# Patient Record
Sex: FEMALE | Race: OTHER | HISPANIC OR LATINO | Employment: UNEMPLOYED | URBAN - METROPOLITAN AREA
[De-identification: names, ages, dates, MRNs, and addresses within clinical notes are randomized per-mention and may not be internally consistent; named-entity substitution may affect disease eponyms.]

---

## 2019-10-08 ENCOUNTER — HOSPITAL ENCOUNTER (OUTPATIENT)
Dept: ONCOLOGY | Facility: HOSPITAL | Age: 54
Discharge: HOME | End: 2019-10-08
Attending: INTERNAL MEDICINE

## 2019-10-08 ENCOUNTER — OFFICE VISIT CONVERTED (OUTPATIENT)
Dept: ONCOLOGY | Facility: HOSPITAL | Age: 54
End: 2019-10-08
Attending: INTERNAL MEDICINE

## 2019-12-20 ENCOUNTER — HOSPITAL ENCOUNTER (OUTPATIENT)
Dept: GENERAL RADIOLOGY | Facility: HOSPITAL | Age: 54
Discharge: HOME | End: 2019-12-20
Attending: NURSE PRACTITIONER

## 2020-01-07 ENCOUNTER — OFFICE VISIT CONVERTED (OUTPATIENT)
Dept: ONCOLOGY | Facility: HOSPITAL | Age: 55
End: 2020-01-07
Attending: INTERNAL MEDICINE

## 2020-01-07 ENCOUNTER — HOSPITAL ENCOUNTER (OUTPATIENT)
Dept: ONCOLOGY | Facility: HOSPITAL | Age: 55
Discharge: HOME | End: 2020-01-07
Attending: INTERNAL MEDICINE

## 2020-02-14 ENCOUNTER — HOSPITAL ENCOUNTER (OUTPATIENT)
Dept: OTHER | Facility: HOSPITAL | Age: 55
Setting detail: RECURRING SERIES
Discharge: STILL ADMITTED OR ADMINISTRATIVE | End: 2020-05-07
Attending: INTERNAL MEDICINE

## 2020-02-14 LAB
CALCIUM SERPL-MCNC: 9.2 MG/DL (ref 8.7–10.4)
CREAT UR-MCNC: 0.73 MG/DL (ref 0.5–0.9)

## 2020-03-02 ENCOUNTER — OFFICE VISIT CONVERTED (OUTPATIENT)
Dept: SURGERY | Facility: CLINIC | Age: 55
End: 2020-03-02
Attending: NURSE PRACTITIONER

## 2020-03-02 ENCOUNTER — CONVERSION ENCOUNTER (OUTPATIENT)
Dept: SURGERY | Facility: CLINIC | Age: 55
End: 2020-03-02

## 2020-05-05 ENCOUNTER — OFFICE VISIT CONVERTED (OUTPATIENT)
Dept: ONCOLOGY | Facility: HOSPITAL | Age: 55
End: 2020-05-05
Attending: INTERNAL MEDICINE

## 2020-05-05 ENCOUNTER — HOSPITAL ENCOUNTER (OUTPATIENT)
Dept: ONCOLOGY | Facility: HOSPITAL | Age: 55
Discharge: HOME | End: 2020-05-05
Attending: INTERNAL MEDICINE

## 2020-05-05 LAB
ALBUMIN SERPL-MCNC: 4.1 G/DL (ref 3.5–5)
ALBUMIN/GLOB SERPL: 1.2 {RATIO} (ref 1.4–2.6)
ALP SERPL-CCNC: 72 U/L (ref 53–141)
ALT SERPL-CCNC: 25 U/L (ref 10–40)
ANION GAP SERPL CALC-SCNC: 10 MMOL/L (ref 8–19)
AST SERPL-CCNC: 19 U/L (ref 15–50)
BASOPHILS # BLD AUTO: 0.03 10*3/UL (ref 0–0.2)
BASOPHILS NFR BLD AUTO: 0.6 % (ref 0–3)
BILIRUB SERPL-MCNC: 0.19 MG/DL (ref 0.2–1.3)
BUN SERPL-MCNC: 11 MG/DL (ref 5–25)
BUN/CREAT SERPL: 18 {RATIO} (ref 6–20)
CALCIUM SERPL-MCNC: 9.4 MG/DL (ref 8.7–10.4)
CALCIUM SPEC-SCNC: 9.4 MG/DL (ref 8.7–10.4)
CHLORIDE SERPL-SCNC: 105 MMOL/L (ref 99–111)
CONV ABS IMM GRAN: 0.01 10*3/UL (ref 0–0.54)
CONV CO2: 26 MMOL/L (ref 22–32)
CONV EOSINOPHILS PERCENT BY MANUAL COUNT: 2 % (ref 0–7)
CONV IMMATURE GRAN: 0.2 % (ref 0–0.4)
CONV TOTAL PROTEIN: 7.4 G/DL (ref 6.3–8.2)
CREAT UR-MCNC: 0.61 MG/DL (ref 0.5–0.9)
EOSINOPHIL # BLD MANUAL: 0.11 10*3/UL (ref 0–0.7)
ERYTHROCYTE [DISTWIDTH] IN BLOOD BY AUTOMATED COUNT: 12.8 % (ref 11.5–14.5)
ERYTHROCYTE [DISTWIDTH] IN BLOOD BY AUTOMATED COUNT: 42.2 FL
GFR SERPLBLD BASED ON 1.73 SQ M-ARVRAT: >60 ML/MIN/{1.73_M2}
GLOBULIN UR ELPH-MCNC: 3.3 G/DL (ref 2–3.5)
GLUCOSE SERPL-MCNC: 109 MG/DL (ref 65–99)
HBA1C MFR BLD: 13.3 G/DL (ref 12–16)
HCT VFR BLD AUTO: 40 % (ref 37–47)
LYMPHOCYTES # BLD AUTO: 2.69 10*3/UL (ref 1–5)
LYMPHOCYTES NFR BLD AUTO: 49.4 % (ref 20–45)
MCH RBC QN AUTO: 29.7 PG (ref 27–31)
MCHC RBC AUTO-ENTMCNC: 33.3 G/DL (ref 33–37)
MCV RBC AUTO: 89.3 FL (ref 81–99)
MONOCYTES # BLD AUTO: 0.41 10*3/UL (ref 0.2–1.2)
MONOCYTES NFR BLD MANUAL: 7.5 % (ref 3–10)
NEUTROPHILS # BLD AUTO: 2.2 10*3/UL (ref 2–8)
NEUTROPHILS NFR BLD MANUAL: 40.3 % (ref 30–85)
OSMOLALITY SERPL CALC.SUM OF ELEC: 284 MOSM/KG (ref 273–304)
PLATELET # BLD AUTO: 286 10*3/UL (ref 130–400)
PMV BLD AUTO: 10.3 FL (ref 7.4–10.4)
POTASSIUM SERPL-SCNC: 3.9 MMOL/L (ref 3.5–5.3)
RBC MORPH BLD: 4.48 10*6/UL (ref 4.2–5.4)
SODIUM SERPL-SCNC: 137 MMOL/L (ref 135–147)
WBC # BLD AUTO: 5.45 10*3/UL (ref 4.8–10.8)

## 2020-05-27 ENCOUNTER — HOSPITAL ENCOUNTER (OUTPATIENT)
Dept: NUCLEAR MEDICINE | Facility: HOSPITAL | Age: 55
Discharge: HOME | End: 2020-05-27
Attending: INTERNAL MEDICINE

## 2020-05-29 LAB — SARS-COV-2 RNA SPEC QL NAA+PROBE: NOT DETECTED

## 2020-06-01 ENCOUNTER — HOSPITAL ENCOUNTER (OUTPATIENT)
Dept: GASTROENTEROLOGY | Facility: HOSPITAL | Age: 55
Setting detail: HOSPITAL OUTPATIENT SURGERY
Discharge: HOME | End: 2020-06-01
Attending: SURGERY

## 2020-06-15 ENCOUNTER — OFFICE VISIT CONVERTED (OUTPATIENT)
Dept: SURGERY | Facility: CLINIC | Age: 55
End: 2020-06-15
Attending: NURSE PRACTITIONER

## 2020-08-14 ENCOUNTER — HOSPITAL ENCOUNTER (OUTPATIENT)
Dept: OTHER | Facility: HOSPITAL | Age: 55
Setting detail: RECURRING SERIES
Discharge: HOME | End: 2020-11-12
Attending: INTERNAL MEDICINE

## 2020-08-14 LAB
CALCIUM SERPL-MCNC: 9.5 MG/DL (ref 8.7–10.4)
CREAT UR-MCNC: 0.71 MG/DL (ref 0.5–0.9)

## 2020-12-21 ENCOUNTER — HOSPITAL ENCOUNTER (OUTPATIENT)
Dept: MAMMOGRAPHY | Facility: HOSPITAL | Age: 55
Discharge: HOME | End: 2020-12-21
Attending: INTERNAL MEDICINE

## 2021-01-11 ENCOUNTER — HOSPITAL ENCOUNTER (OUTPATIENT)
Dept: ONCOLOGY | Facility: HOSPITAL | Age: 56
Discharge: HOME | End: 2021-01-11
Attending: INTERNAL MEDICINE

## 2021-01-11 ENCOUNTER — OFFICE VISIT CONVERTED (OUTPATIENT)
Dept: ONCOLOGY | Facility: HOSPITAL | Age: 56
End: 2021-01-11
Attending: INTERNAL MEDICINE

## 2021-02-24 ENCOUNTER — HOSPITAL ENCOUNTER (OUTPATIENT)
Dept: OTHER | Facility: HOSPITAL | Age: 56
Setting detail: RECURRING SERIES
Discharge: HOME | End: 2021-02-24
Attending: INTERNAL MEDICINE

## 2021-02-24 LAB
CALCIUM SERPL-MCNC: 9.6 MG/DL (ref 8.7–10.4)
CREAT UR-MCNC: 0.69 MG/DL (ref 0.5–0.9)

## 2021-05-10 NOTE — H&P
History and Physical      Patient Name: Sindi Dang   Patient ID: [id number]   Sex: Female   YOB: 1965    Primary Care Provider: Miley GALLOWAY   Referring Provider: Miley GALLOWAY    Visit Date: Alejandra 15, 2020    Provider: NILESH Pond   Location[facility]   Location Address: [address] [place] [address]  [id number]   Location Phone: [phone]          Chief Complaint  · Follow Up Colonoscopy      History Of Present Illness  Sindi Dang is a 54 year old Other Race,  or  female who is here to follow up colonoscopy.      Patient presents today for follow-up after having a colonoscopy on 6/1/2020 performed by Dr. Drew Mendez.  Patient was with a benign colon polyp in the transverse colon negative for any adenoma.  Denies any postoperative complications.       Past Medical History  Disease Name Date Onset Notes   Allergic rhinitis, chronic --  --    Bladder Disorder --  --    Breast lump --  --    Cancer --  --    GERD --  --          Past Surgical History  Procedure Name Date Notes   Breast --  --          Medication List  Name Date Started Instructions   anastrozole 1 mg oral tablet  take 1 tablet (1 mg) by oral route once daily   Calcium 500 oral  --    clotrimazole topical  --    multivitamin oral tablet  --    Omega-3 350 mg-235 mg- 90 mg-597 mg oral capsule,delayed release(/EC)  --    Protonix oral  --    Reclast 5 mg/100 mL intravenous piggyback  --    tretinoin topical  --    Zomig oral  --          Allergy List  Allergen Name Date Reaction Notes   NO KNOWN DRUG ALLERGIES --  --  --        Allergies Reconciled  Family Medical History  Disease Name Relative/Age Notes   Diabetes, unspecified type Brother/  Mother/  Sister/   Mother; Brother; Sister   -Mother's Family History Unknown Mother/   Mother         Social History  Finding Status Start/Stop Quantity Notes   Tobacco Never --/-- --  --          Review of  "Systems  · Constitutional  o Denies  o : chills, fever  · Eyes  o Denies  o : yellowish discoloration of eyes  · HENT  o Denies  o : difficulty swallowing  · Cardiovascular  o Denies  o : chest pain on exertion  · Respiratory  o Denies  o : shortness of breath  · Gastrointestinal  o Denies  o : nausea, vomiting, diarrhea, constipation  · Genitourinary  o Denies  o : abnormal color of urine  · Integument  o Denies  o : rash  · Neurologic  o Denies  o : tingling or numbness  · Musculoskeletal  o Denies  o : joint pain  · Endocrine  o Denies  o : weight gain, weight loss      Vitals  Date Time BP Position Site L\R Cuff Size HR RR TEMP (F) WT  HT  BMI kg/m2 BSA m2 O2 Sat HC       06/15/2020 01:55 PM       12  182lbs 8oz 5'  5\" 30.37 1.95           Physical Examination  · Constitutional  o Appearance  o : well developed, well-nourished, patient in no apparent distress  · Head and Face  o Head  o :   § Inspection  § : atraumatic, normocephalic  o Face  o :   § Inspection  § : no facial lesions  · Eyes  o Conjunctivae  o : conjunctivae normal  o Sclerae  o : sclerae white  · Neck  o Inspection/Palpation  o : normal appearance, no masses or tenderness, trachea midline  · Respiratory  o Respiratory Effort  o : breathing unlabored  · Skin and Subcutaneous Tissue  o General Inspection  o : no lesions present, no areas of discoloration, skin turgor normal, texture normal  · Neurologic  o Mental Status Examination  o :   § Orientation  § : grossly oriented to person, place and time  § Attention  § : attention normal, concentration abilities normal  § Fund of Knowledge  § : fund of knowledge within normal limits, patient aware of current events  o Gait and Station  o : normal gait, able to stand without difficulty  · Psychiatric  o Judgement and Insight  o : judgment and insight intact  o Mood and Affect  o : mood normal, affect appropriate          Assessment  · Postoperative Exam Following Surgery     V67.00/Z09  · S/P " colonoscopy with polypectomy     V45.89/Z98.890  · Colon polyp     211.3/K63.5      Plan  · Medications  o Medications have been Reconciled  o Transition of Care or Provider Policy  · Instructions  o Per the AGA guidelines of 2012 patient is to follow up for colonoscopy surveillance  o Follow up in 5 years.  o Rescreen colon in 5 years follow-up in interim with me.  o Electronically Identified Patient Education Materials Provided Electronically  · Disposition  o Call or Return if symptoms worsen or persist.  · Referrals  o ID: [id number] Date: 03/02/2020 Type: Inbound  Specialty: General Surgery            Electronically Signed by: Vanessa Langley APRN -Author on Alejandra 15, 2020 02:23:09 PM

## 2021-05-15 VITALS — BODY MASS INDEX: 30.82 KG/M2 | WEIGHT: 185 LBS | HEART RATE: 64 BPM | HEIGHT: 65 IN | OXYGEN SATURATION: 98 %

## 2021-05-15 VITALS — HEIGHT: 65 IN | BODY MASS INDEX: 30.41 KG/M2 | WEIGHT: 182.5 LBS | RESPIRATION RATE: 12 BRPM

## 2021-05-22 ENCOUNTER — TRANSCRIBE ORDERS (OUTPATIENT)
Dept: ONCOLOGY | Facility: HOSPITAL | Age: 56
End: 2021-05-22

## 2021-05-22 DIAGNOSIS — Z78.0 POST-MENOPAUSAL: Primary | ICD-10-CM

## 2021-05-28 VITALS
RESPIRATION RATE: 16 BRPM | DIASTOLIC BLOOD PRESSURE: 81 MMHG | TEMPERATURE: 97.1 F | OXYGEN SATURATION: 98 % | BODY MASS INDEX: 30.45 KG/M2 | SYSTOLIC BLOOD PRESSURE: 161 MMHG | HEART RATE: 71 BPM | WEIGHT: 182.98 LBS

## 2021-05-28 VITALS
TEMPERATURE: 97.8 F | RESPIRATION RATE: 16 BRPM | SYSTOLIC BLOOD PRESSURE: 144 MMHG | OXYGEN SATURATION: 100 % | WEIGHT: 180.78 LBS | DIASTOLIC BLOOD PRESSURE: 78 MMHG | TEMPERATURE: 98.6 F | OXYGEN SATURATION: 100 % | DIASTOLIC BLOOD PRESSURE: 76 MMHG | RESPIRATION RATE: 16 BRPM | OXYGEN SATURATION: 100 % | RESPIRATION RATE: 16 BRPM | BODY MASS INDEX: 28.42 KG/M2 | BODY MASS INDEX: 29.4 KG/M2 | WEIGHT: 184.53 LBS | HEIGHT: 66 IN | HEART RATE: 71 BPM | DIASTOLIC BLOOD PRESSURE: 80 MMHG | TEMPERATURE: 98.3 F | BODY MASS INDEX: 30.71 KG/M2 | HEART RATE: 68 BPM | SYSTOLIC BLOOD PRESSURE: 149 MMHG | SYSTOLIC BLOOD PRESSURE: 159 MMHG | HEART RATE: 73 BPM | WEIGHT: 176.81 LBS

## 2021-05-28 NOTE — PROGRESS NOTES
Patient: DEE DANG     Acct: [id number]     Report: #[id number]  UNIT #: [id number]     : 1965    Encounter Date:2020  PRIMARY CARE: MCKENZIE SUAREZ  ***Signed***  --------------------------------------------------------------------------------------------------------------------  NURSE INTAKE      Visit Type      Established Patient Visit            Chief Complaint      L BREAST CANCER            Referring Provider/Copies To      Primary Care Provider:  MCKENZIE SUAREZ      Copies To:   MCKENZIE SUAREZ            History and Present Illness      Past Oncology Illness History      Mrs. Dang is a 55 yo female dx with left breast ca in 2017.  She underwent left    lumpectomy with SN bx on 17--pathology showed invasive ductal ca with     lobular features, negative margins and 0/4 nodes+.  ER+ CT- HER2- per FISH, Ki     67 20%.  Oncotype DX returned intermediate at 25.  Pt decided against adjuvant     systemic therapy.  She completed XRT to lumpectomy site with Dr. Damaris Wilkins.    Dr. Luis Fernando Vivar was med onc. She was initiated on Anastrozole on 18 and     then began Reclast. She recently moved from [place].            \[facility]\"" - Oncology Interim      Patient returns for ongoing treatment of her breast cancer.  She is on adjuvant     anastrozole with zoledronic acid.  She is compliant with her regimen.  She     denies any issues from her anastrozole.  She reports that she has had increased     bone pain since her last visit.  The bones are achy and the pain moves around     somewhat.  She describes it more so in the knees, hips and hands.  She feels it     is more pronounced than she has had in the past.  Tylenol does help.  Since not     interfering with her normal activities.  She denies any dental or jaw pain.  No     new masses lymphadenopathy.  She reports good appetite and her weight is     maintained.  Her energy level is adequate for daily needs.            Cancer Details            Left  "breast--Invasive ductal carcinoma with lobular features--ER+ MO-      HER2- by FISH Ki 67 20% Oncotype DX 25            Clinical Staging      Stage IA (T1N0M0)            Treatments      Chemotherapy      opted out of adj chemotherapy; began Anastrozole on 1/25/18      Radiation Therapy      3/19/18 completed XRT to left lumpectomy site      Surgeries      12/13/17 right lumpectomy            Clinical Trial Participant      No            ECOG Performance Status      0            PAST, FAMILY   Past Medical History      Past Medical History:  Thyroid Disease      Hematology/Oncology (F):  Breast Cancer (LEFT)            Past Surgical History      Biopsy (LEFT BREAST), Lumpectomy            BUNIONECTOMY, TUBAL LIGATION            Family History      Family History:  Anemia (MOTHER AND SISTER)            Social History      Marital Status:        Lives independently:  Yes      Number of Children:  1      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            Tobacco Use      Tobacco status:  Never smoker            Alcohol Use      Alcohol intake:  None            Substance Use      Substance use:  Denies use            REVIEW OF SYSTEMS      General:  Admits: Fatigue      ENT:  Denies Hoarseness      Respiratory:  Denies: Cough, Shortness of Air      Gastrointestinal:  Admits Nausea/Vomiting (NAUSEA); Denies Diarrhea      Musculoskeletal:  Denies Back Pain; Admits Muscle Pain (PT STATES \"ALL OVER     PAIN\"), Admits Painful Joints (PT STATES \"ALL OVER PAIN\")      Neurologic:  Denies Dizziness            VITAL SIGNS AND SCORES      Vitals      Weight 184 lbs 8.400 oz / 83.7 kg      Temperature 97.8 F / 36.56 C - Temporal      Pulse 73      Respirations 16      Blood Pressure 144/76 Sitting, Right Arm      Pulse Oximetry 100%, ROOM AIR            Pain Score      Experiencing any pain?:  Yes (PT STATES \"ALL OVER PAIN\")      Pain Scale Used:  Numerical      Pain Intensity:  4            Fatigue Score      Experiencing any fatigue?:  Yes "      Fatigue (0-10 scale):  5            EXAM      General Appearance:  Positive for: Alert, Cooperative;          Negative for: Acute Distress      Eye:  Positive for: Anicteric Sclerae, Moist Conjunctiva      Neck:  Positive for: Supple;          Negative for: JVD, Masses      Respiratory:  Positive for: CTAB, Normal Respiratory Effort      Breast - Left:  Positive for: Appearance (Well-healed surgical incision on the     breast and axilla);          Negative for: Adenopathy      Breast - Right:  Positive for: Appearance (Normal-appearing female breast);          Negative for: Adenopathy      Abdomen/Gastro:  Positive for: Normal Active Bowel Sounds, Soft;          Negative for: Distention, Hepatosplenomegaly, Tenderness      Cardiovascular:  Positive for: RRR;          Negative for: Gallop, Murmur, Peripheral Edema, Rub      Psychiatric:  Positive for: Appropriate Affect, Intact Judgement      Lymphatic:  Negative for: Axillary, Cervical, Infraclavicular, Supraclavicular            PREVENTION      Hx Influenza Vaccination:  Yes      Date Influenza Vaccine Given:  Oct 1, 2019      Influenza Vaccine Declined:  No      2 or More Falls Past Year?:  No      Fall Past Year with Injury?:  No      Hx Pneumococcal Vaccination:  Yes      Encouraged to follow-up with:  PCP regarding preventative exams.      Chart initiated by      FIOR BROUSSARD MA            ALLERGY/MEDS      Allergies      Coded Allergies:             NO KNOWN ALLERGIES (Unverified , 5/5/20)            Medications      Last Reconciled on 5/5/20 12:30 by DREA IGLESIAS      Krill Oil/Omega-3/Dha/Epa (Omega-3 Krill Oil) 1 Each Capsule      500 MG PO QDAY, CAP         Reported         2/14/20       ZOLMitriptan (Zomig) 2.5 Mg Tablet      2.5 MG PO ASDIR, TAB         Reported         2/14/20       Zoledronic Acid/Mannitol  5 MG IV ONCE, #100 ML         Reported         2/14/20       Anastrozole (Anastrozole) 1 Mg Tablet      1 MG PO QDAY for 90 Days, #90  TAB 3 Refills         Prov: DREA IGLESIAS         1/7/20       Omega 3 Polyunsat Fatty Acids (Omega-3 Fish Oil) 1 Each Capsule      1 GM PO QDAY, #30 CAP 0 Refills         Reported         10/8/19       Calcium Carbonate (Calcium Antacid) 1,000 Mg Tab.chew      1200 MG PO QDAY, TAB.CHEW         Reported         10/8/19       Multivitamin (Multivitamins) 1 Each Capsule      1 EACH PO QDAY, CAP         Reported         10/8/19       Tretinoin (Atralin 0.05%) 45 Gm Gel..gram.      1 APL TOPICAL HS, #1 TUBE         Reported         10/8/19       Pantoprazole (Protonix) 20 Mg Tablet      40 MG PO HS, #60 TAB 0 Refills         Reported         10/8/19      Medications Reviewed:  No Changes made to meds            IMPRESSION/PLAN      Diagnosis      Cancer of left breast, stage 1, estrogen receptor positive - C50.912, Z17.0      Patient is on adjuvant therapy with anastrozole and zoledronic acid.  Tolerating    both well.  Continue anastrozole daily for minimum 5 years.  She will continue     adjuvant zoledronic acid 4 mg every 6 months for a total of 5 years.  No dental     or jaw pain at this point.  Recent calcium and creatinine were normal.  I will     see her back with her next infusion            Bone pain - M89.8X9      Given her increased symptoms.  She will have lab work today and I will order a     bone scan.  I will call her with the results.            Notes      New Diagnostics      * Bone Scan Wh Body NM, As Soon As Possible         Dx: Generalized body aches - R52      * CCC CBC With Auto Diff, Routine         Dx: Cancer of left breast, stage 1, estrogen receptor positive - C50.912,        Z17.0      * CCC Comp Metabolic Panel, Routine         Dx: Cancer of left breast, stage 1, estrogen receptor positive - C50.912,        Z17.0            Patient Education            Aerobic Exercise      Patient Education Provided:  Yes            Electronically signed by DREA IGLESIAS  05/05/2020 12:30       Disclaimer:  Converted document may not contain table formatting or lab diagrams. Please see [employer] [facility] for the authenticated document.

## 2021-05-28 NOTE — PROGRESS NOTES
Patient: DEE DANG     [id number]     Report: #[id number]  UNIT #: [id number]     : 1965    Encounter Date:2021  PRIMARY CARE: MCKENZIE SUAREZ  ***Signed***  --------------------------------------------------------------------------------------------------------------------  NURSE INTAKE      Visit Type      Established Patient Visit            Chief Complaint      BREAST CA F/U      Intent of Therapy:  Curative            History and Present Illness      Past Oncology Illness History      Mrs. Dang is a 56 yo female dx with left breast ca in .  She underwent left    lumpectomy with SN bx on 17--pathology showed invasive ductal ca with     lobular features, negative margins and 0/4 nodes+.  ER+ CO- HER2- per FISH, Ki     67 20%.  Oncotype DX returned intermediate at 25.  Pt decided against adjuvant     systemic therapy.  She completed XRT to lumpectomy site with Dr. Damaris Wilkins.    Dr. Luis Fernando Vivar was med onc. She was initiated on Anastrozole on 18 and     then began Reclast. She recently moved from [place].            [facility] - Oncology Interim      Patient returns for follow-up of her breast cancer.  She is on anastrozole along    with adjuvant zoledronic acid.  She is tolerating both the infusion and the pill    without difficulty.  She denies any masses or lymphadenopathy.  No unusual aches    or pains.  She is trying to exercise.  She notes good appetite and energy level.     She denies dental or jaw pain            Cancer Details            Left breast--Invasive ductal carcinoma with lobular features--ER+ CO-      HER2- by FISH Ki 67 20% Oncotype DX 25            Clinical Staging      Stage IA (T1N0M0)            Treatments      Chemotherapy      opted out of adj chemotherapy; began Anastrozole on 18      Radiation Therapy      3/19/18 completed XRT to left lumpectomy site      Surgeries      17 left lumpectomy            Clinical Trial Participant      No             ECOG Performance Status      0            Most Recent Imaging Findings      Patient: DEE MELENDEZ   Acct: #[id number]   Report: #[id number]            UNIT #: [id number]    DOS: 20 1501      INSURANCE:"[employer]    ORDER #:[id number]      LOCATION:MAMMO     : 1965            PROVIDERS      ADMITTING:     ATTENDING: DREA IGLESIAS      FAMILY:  MCKENZIE SUAREZ   ORDERING:  DREA IGLESIAS         OTHER:    DICTATING:  Concepcion Morin MD            REQ #:[id number]   EXAM:DSBMWT - DIG SCREEN BILAT CELINE w 3D ALVINA      REASON FOR EXAM:  SCREENING      REASON FOR VISIT:  SCREENING            *******Signed******         PROCEDURE:   DIGITAL SCREENING BILATERAL MAMOGRAM WITH 3D TOMOSYNTHESIS             COMPARISON:   Other, MG, MAMMO-DIAG LT, 2017, 10:11.  Other, MG, MAMMO-DI    AG LT, 2017,       13:40.  [facility], MG, DIG SCREENING BILAT CELINE W 3D ALVINA,    2019, 11:01.             VIEWS:  BILATERAL CC AND MLO VIEWS WERE OBTAINED UTILIZING 3D TOMOSYNTHESIS AND     [employer] CAD SOFTWARE             INDICATIONS:   SCREENING             FINDINGS:      Post lumpectomy changes on the left appear stable.               No suspicious mass, area of architectural distortion or suspicious microca    lcification is       identified.              CONCLUSION:      Benign mammogram. Suggest routine mammographic screening.             RECOMMENDATION(S):          ROUTINE MAMMOGRAM AND CLINICAL EVALUATION IN 12 MONTHS.               BIRADS:          DIAGNOSTIC CATEGORY 2--BENIGN FINDING               BREAST COMPOSITION:   Scattered areas fibroglandular density.             PLEASE NOTE:  A NORMAL MAMMOGRAM DOES NOT EXCLUDE THE POSSIBILITY OF BREAST     CANCER.       ANY CLINICALLY SUSPICIOUS PALPABLE LUMP SHOULD BE BIOPSIED.                CONCEPCION MORIN MD             Electronically Signed and Approved By: CONCEPCION MORIN MD on 2020 at 16:41                                   Until signed, this is an unconfirmed preliminary report that may contain            PAST, FAMILY   Past Medical History      Past Medical History:  Thyroid Disease      Hematology/Oncology (F):  Breast Cancer            Past Surgical History      Biopsy, Lumpectomy            BUNIONECTOMY, TUBAL LIGATION            Family History      Family History:  Anemia            Social History      Lives independently:  Yes      Number of Children:  1      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            Tobacco Use      Tobacco status:  Never smoker            Substance Use      Substance use:  Denies use            REVIEW OF SYSTEMS      General:  Denies: Fatigue      ENT:  Denies Headache      Cardiovascular:  Denies Chest Pain, Denies Palpitations      Respiratory:  Denies: Coughing Blood, Productive Cough      Gastrointestinal:  Denies Bloody Stools, Denies Problem Swallowing      Musculoskeletal:  Denies Painful Joints      Integumentary:  Denies Rash            VITAL SIGNS AND SCORES      Vitals      Weight 182 lbs 15.709 oz / 83 kg      Temperature 97.1 F / 36.17 C - Temporal      Pulse 71      Respirations 16      Blood Pressure 161/81 Sitting, Left Arm      Pulse Oximetry 98%, RM AIR            Pain Score      Pain Scale Used:  Numerical      Pain Intensity:  0            Fatigue Score      Fatigue (0-10 scale):  0 (none)            EXAM      General Appearance:  Positive for: Alert, Cooperative;          Negative for: Acute Distress      Neck:  Positive for: Supple;          Negative for: JVD, Masses      Respiratory:  Positive for: CTAB, Normal Respiratory Effort      Breast - Left:  Positive for: Appearance (Well-healed surgical incision on the     breast and axilla);          Negative for: Adenopathy, Discharge, Mass, Skin Changes      Breast - Right:  Positive for: Appearance (Normal-appearing female breast);          Negative for: Adenopathy, Discharge, Mass, Skin Changes      Abdomen/Gastro:  Positive for: Normal Active  Bowel Sounds, Soft;          Negative for: Distention, Hepatosplenomegaly, Tenderness      Cardiovascular:  Positive for: RRR;          Negative for: Gallop, Murmur, Peripheral Edema, Rub      Psychiatric:  Positive for: Appropriate Affect, Intact Judgement      Lymphatic:  Negative for: Axillary, Cervical, Infraclavicular, Supraclavicular            PREVENTION      Hx Influenza Vaccination:  Yes      Date Influenza Vaccine Given:  Sep 1, 2020      Influenza Vaccine Declined:  No      2 or More Falls in Past Year?:  No      Fall Past Year with Injury?:  No      Hx Pneumococcal Vaccination:  No      Encouraged to follow-up with:  PCP regarding preventative exams.      Chart initiated by      ASHLEY GAMBINO MA            ALLERGY/MEDS      Allergies      Coded Allergies:             NO KNOWN ALLERGIES (Unverified , 1/11/21)            Medications      Last Reconciled on 1/11/21 16:43 by DREA IGLESIAS      Anastrozole (Anastrozole) 1 Mg Tablet      1 MG PO QDAY for 90 Days, #90 TAB 3 Refills         Prov: DREA IGLESIAS         1/11/21       Pantoprazole (Protonix) 40 Mg Tablet.dr      40 MG PO QDAY, #30 TAB 0 Refills         Reported         5/26/20       Calcium Carb/Vit D3 (CALCIUM 600-VIT D3 400 TABLET) 1 Each Tablet      1 EACH PO BID, #120 TAB 0 Refills         Reported         5/26/20       Krill Oil/Omega-3/Dha/Epa (Omega-3 Krill Oil) 1 Each Capsule      500 MG PO QDAY, CAP         Reported         2/14/20       ZOLMitriptan (Zomig) 2.5 Mg Tablet      2.5 MG PO ASDIR, TAB         Reported         2/14/20       Zoledronic Acid/Mannitol  5 MG IV ONCE, #100 ML         Reported         2/14/20       Multivitamin (Multivitamins) 1 Each Capsule      1 EACH PO QDAY, CAP         Reported         10/8/19       Tretinoin (Atralin 0.05%) 45 Gm Gel..gram.      1 APL TOPICAL HS, #1 TUBE         Reported         10/8/19      Medications Reviewed:  No Changes made to meds            IMPRESSION/PLAN      Diagnosis      Cancer  of left breast, stage 1, estrogen receptor positive - C50.912, Z17.0      Patient is on adjuvant anastrozole and Zometa.  Tolerating both well.  I see no     evidence of disease recurrence by history, physical examination or recent     mammogram.  She denies any dental or jaw pain.  Continue current therapy for 5     years.  Refill provided today.  RTC 6 months for OV, Zometa with labs prior            Notes      Renewed Medications      * Anastrozole 1 MG TABLET: 1 MG PO QDAY 90 Days #90            Pain      Pain Zero Today            Advanced Care Plan Discussion      Declines Discussion [id number]            Patient Education            Aerobic Exercise      Patient Education Provided:  Yes            Electronically signed by DREA IGLESIAS  01/11/2021 16:43       Disclaimer: Converted document may not contain table formatting or lab diagrams. Please see [employer] for the authenticated document.   No

## 2021-05-28 NOTE — PROGRESS NOTES
Patient: DEE DANG     Acct: [id number]     Report: #[id number]  UNIT #: [id number]     : 1965    Encounter Date:2020  PRIMARY CARE: MCKENZIE SUAREZ  ***Signed***  --------------------------------------------------------------------------------------------------------------------  NURSE INTAKE      Visit Type      Established Patient Visit            Chief Complaint      l breast ca/mammo results            History and Present Illness      Past Oncology Illness History      Mrs. Dang is a 55 yo female dx with left breast ca in 2017.  She underwent left    lumpectomy with SN bx on 17--pathology showed invasive ductal ca with     lobular features, negative margins and 0/4 nodes+.  ER+ OK- HER2- per FISH, Ki     67 20%.  Oncotype DX returned intermediate at 25.  Pt decided against adjuvant     systemic therapy.  She completed XRT to lumpectomy site with Dr. Damaris Wilkins.    Dr. Luis Fernando Vivar was med onc. She was initiated on Anastrozole on 18 and     then began Reclast on 2/15/18.      No family hx of breast cancer.  Pt does not smoke, drink or do illegal drugs.      She recently moved from [place].        Pt due for mammogram 2019  and DEXA good thru 2021.      PMH: GERD, thyroid nodule (non-toxic goiter--last US 19)      PSH:  ureteral sling, bunionectomy and rt elbow            HPI - Oncology Interim      Patient returns today for follow-up of her breast cancer.  She is now on     anastrozole for adjuvant hormone therapy.  She is taking her medication daily as    prescribed.  She denies any problems or side effects.  She started the     medication 2018.  She denies new masses lymphadenopathy.  No unusual aches     or pains.  She had recent mammogram.  She has been trying to exercise.            Cancer Details            Left breast--Invasive ductal carcinoma with lobular features--ER+ OK-      HER2- by FISH Ki 67 20% Oncotype DX 25            Clinical Staging      Stage IA  (T1N0M0)            Treatments      Chemotherapy      opted out of adj chemotherapy; began Anastrozole on 18      Radiation Therapy      3/19/18 completed XRT to left lumpectomy site      Surgeries      17 right lumpectomy            Clinical Trial Participant      No            ECOG Performance Status      0            Most Recent Imaging Findings      Patient: DEE MELENDEZ   Acct: #[id number]   Report: #[id number]            UNIT #: [id number]    DOS: 19 1100      INSURANCE:[employer]   ORDER #:[id number]      LOCATION:[facility]     : 1965            PROVIDERS      ADMITTING:     ATTENDING: JUAN MORRISON      FAMILY:  NONE,MD   ORDERING:  JUAN MORRISON         OTHER:    DICTATING:  KELSEY CHRISTOPHER MD            REQ #:[id number]   EXAM:DSBMWT - DIG SCREEN BILAT CELINE w 3D ALVINA      REASON FOR EXAM:        REASON FOR VISIT:  SCREENING            *******Signed******         PROCEDURE:   DIGITAL SCREENING BILATERAL MAMOGRAM WITH 3D TOMOSYNTHESIS             COMPARISON:   None.             VIEWS:  BILATERAL CC AND MLO VIEWS WERE OBTAINED UTILIZING 3D TOMOSYNTHESIS AND     R2 CAD SOFTWARE             INDICATIONS:   SCREENING             FINDINGS:             RIGHT BREAST:  No significant suspicious finding.               LEFT BREAST:  Changes related to prior treatment are present in the central     breast.  There are       clips and architectural distortion changes present.             CONCLUSION:  Post treatment changes of the left breast with an area of     architectural distortion       adjacent to surgical clips.  A comparison with the prior exam would be useful if    feasible.             RECOMMENDATION(S):          ROUTINE MAMMOGRAM AND CLINICAL EVALUATION IN 12 MONTHS.               BIRADS:          DIAGNOSTIC CATEGORY 2--BENIGN FINDING.               BREAST COMPOSITION:   There are scattered areas of fibroglandular density.             PLEASE NOTE:  A NORMAL MAMMOGRAM  DOES NOT EXCLUDE THE POSSIBILITY OF BREAST     CANCER.       ANY CLINICALLY SUSPICIOUS PALPABLE LUMP SHOULD BE BIOPSIED.                KELSEY CHRISTOPHER MD             Electronically Signed and Approved By: KELSEY CHRISTOPHER MD on 1/02/2020 at 8:48                        Until signed, this is an unconfirmed preliminary report that may contain      errors and is subject to change.                                              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            Tobacco Use      Tobacco status:  Never smoker            Alcohol Use      Alcohol intake:  None            Substance Use      Substance use:  Denies use            REVIEW OF SYSTEMS      General:  Admits: Fatigue      Eye:  Admits Corrective Lenses      ENT:  Admits Headache, Admits Sore Throat      Cardiovascular:  Denies Chest Pain      Respiratory:  Admits: Productive Cough, Wheezing      Musculoskeletal:  Denies Back Pain, Denies Muscle Pain      Neurologic:  Denies Dizziness, Denies Numbness\Tingling            VITAL SIGNS AND SCORES      Vitals      Weight 180 lbs 12.435 oz / 82 kg      Temperature 98.6 F / 37 C - Temporal      Pulse 71      Respirations 16      Blood Pressure 159/80 Sitting, Right Arm      Pulse Oximetry 100%, rm air            Pain Score      Pain Scale Used:  Numerical      Pain Intensity:  0            Fatigue Score      Fatigue (0-10 scale):  1            EXAM      General Appearance:  Positive for: Alert, Oriented x3, Cooperative;          Negative for: Acute Distress       Neck:  Positive for: Supple;          Negative for: JVD, Masses      Respiratory:  Positive for: CTAB, Normal Respiratory Effort      Breast - Left:  Positive for: Appearance (Well-healed surgical incision on the     breast and axilla);          Negative for: Adenopathy      Breast - Right:  Positive for: Appearance (Normal-appearing female breast);          Negative for: Adenopathy      Abdomen/Gastro:  Positive for: Normal Active Bowel Sounds, Soft;          Negative for: Distention, Hepatosplenomegaly, Tenderness      Cardiovascular:  Positive for: RRR;          Negative for: Gallop, Murmur, Peripheral Edema, Rub      Psychiatric:  Positive for: Appropriate Affect, Intact Judgement      Lymphatic:  Negative for: Axillary, Cervical, Infraclavicular, Supraclavicular            PREVENTION      Hx Influenza Vaccination:  Yes      Date Influenza Vaccine Given:  Oct 1, 2019      2 or More Falls Past Year?:  No      Fall Past Year with Injury?:  No      Hx Pneumococcal Vaccination:  Yes      Encouraged to follow-up with:  PCP regarding preventative exams.      Chart initiated by      ASHLEY GAMBINO MA            ALLERGY/MEDS      Allergies      Coded Allergies:             NO KNOWN ALLERGIES (Unverified , 1/7/20)            Medications      Last Reconciled on 1/7/20 13:44 by DREA IGLESIAS      Anastrozole (Anastrozole) 1 Mg Tablet      1 MG PO QDAY for 90 Days, #90 TAB 3 Refills         Prov: DREA IGLESIAS         1/7/20       Omega 3 Polyunsat Fatty Acids (Omega-3 Fish Oil) 1 Each Capsule      1 GM PO QDAY, #30 CAP 0 Refills         Reported         10/8/19       Calcium Carbonate (Calcium Antacid) 1,000 Mg Tab.chew      1000 MG PO QDAY, TAB.CHEW         Reported         10/8/19       Multivitamin (Multivitamins) 1 Each Capsule      1 EACH PO QDAY, CAP         Reported         10/8/19       Tretinoin (Atralin 0.05%) 45 Gm Gel..gram.      1 APL TOPICAL HS, #1 TUBE         Reported         10/8/19       Pantoprazole  (Protonix*) 20 Mg Tablet      40 MG PO HS, #60 TAB 0 Refills         Reported         10/8/19       Zoledronic Ac/Mannitol/0.9NACL (Zoledronic Acid 4 mg/100 ml) 4 Mg/100 Ml     Piggyback      4 MG IV ONCE, BOTTLE         Reported         10/8/19      Medications Reviewed:  No Changes made to meds            IMPRESSION/PLAN      Diagnosis      Cancer of left breast, stage 1, estrogen receptor positive - C50.912, Z17.0            Notes      Patient is on adjuvant hormone therapy with anastrozole 1 mg daily started 1/18.     She is doing well.  I see no evidence of disease recurrence by history,     physical examination or recent mammogram.  Continue anastrozole for minimum 5     years.  Refill provided today.  She is on Reclast every 6 months and is due for     next dose February 2020.  She will need lab work before that appointment.  I     will see her back in 4 months for ongoing surveillance.  We discussed low-fat     low-cholesterol diet and routine aerobic exercise as lifestyle modifications to     decrease the risk of breast cancer recurrence.      Renewed Medications      * Anastrozole 1 MG TABLET:         From: 1 MG PO QDAY #30         To: 1 MG PO QDAY 90 Days #90            Patient Education            Aerobic Exercise      Eat Well, Exercise Well, Be Well: Dietary and Fitness Guidelines      Patient Education Provided:  Yes            Electronically signed by DREA IGLESIAS  01/07/2020 15:28       Disclaimer: Converted document may not contain table formatting or lab diagrams. Please see [employer] for the authenticated document.

## 2021-05-28 NOTE — PROGRESS NOTES
Patient: DEE DANG     Acct: [id number]     Report: #[id number]  UNIT #: [id number]     : 1965    Encounter Date:10/08/2019  PRIMARY CARE: MCKENZIE SUAREZ  ***Signed***  --------------------------------------------------------------------------------------------------------------------  NURSE INTAKE      Visit Type      New Patient Visit            Chief Complaint      F-U FOR L BREAST CA [place].      Intent of Therapy:  Curative            Referring Provider/Copies To      Provider Not Found in Lookup:  DR SUAREZ      PCP Not Found in Lookup:  DR SUAREZ            History and Present Illness      Past Oncology Illness History      Mrs. Dang is a 55 yo female dx with left breast ca in .  She underwent left    lumpectomy with SN bx on 17--pathology showed invasive ductal ca with     lobular features, negative margins and 0/4 nodes+.  ER+ MO- HER2- per FISH, Ki     67 20%.  Oncotype DX returned intermediate at 25.  Pt decided against adjuvant     systemic therapy.  She completed XRT to lumpectomy site with Dr. Damaris Wilkins.    Dr. Luis Fernando Vivar was med onc. She was initiated on Anastrozole on 18 and     then began Reclast on 2/15/18.      No family hx of breast cancer.  Pt does not smoke, drink or do illegal drugs.      She recently moved from [place].        Pt due for mammogram 2019  and DEXA good thru 2021.      PMH: GERD, thyroid nodule (non-toxic goiter--last US 19)      PSH:  ureteral sling, bunionectomy and rt elbow            HPI - Oncology Interim      Initial visit to establish care for left breast cancer--taking Anastrozole daily    w/o issues.  Due for Reclast in 2020.  She denies jaw pain or dental issues.     She denies changes with either breast at this time.  She is not participating     in routine exercise at this time.  Reports some fatigue.  Good appetite; wt is     stable.  No distress at this time.            Cancer Details            Left breast--Invasive  ductal carcinoma with lobular features--ER+ HI-      HER2- by FISH Ki 67 20% Oncotype DX 25            Clinical Staging      Stage IA (T1N0M0)            Treatments      Chemotherapy      opted out of adj chemotherapy; began Anastrozole on 1/25/18      Radiation Therapy      3/19/18 completed XRT to left lumpectomy site            Clinical Trial Participant      No            ECOG Performance Status      0            PAST, FAMILY   Past Medical History      Past Medical History:  Thyroid Disease      Hematology/Oncology (F):  Breast Cancer            Past Surgical History      Biopsy (LEFT BREAST), Lumpectomy            BUNIONECTOMY, TUBAL LIGATION            Family History      Family History:  Anemia (MOTHER AND SISTER)            Social History      Marital Status:        Lives independently:  Yes      Number of Children:  1      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            Tobacco Use      Tobacco status:  Never smoker            Alcohol Use      Alcohol intake:  None            Substance Use      Substance use:  Denies use            REVIEW OF SYSTEMS      General:  Denies: Fatigue      Eye:  Admits Corrective Lenses      ENT:  Denies Headache; Admits Sore Throat      Cardiovascular:  Denies Chest Pain      Respiratory:  Denies: Shortness of Air      Gastrointestinal:  Denies Constipation, Denies Diarrhea      Musculoskeletal:  Admits Muscle Pain, Admits Painful Joints      Integumentary:  Denies Itching      Neurologic:  Denies Dizziness, Denies Numbness\Tingling      Psychiatric:  Denies Anxiety, Denies Depression            VITAL SIGNS AND SCORES      Vitals      Height 5 ft 5.75 in / 167 cm      Weight 176 lbs 12.943 oz / 80.2 kg      BSA 1.89 m2      BMI 28.8 kg/m2      Temperature 98.3 F / 36.83 C - Temporal      Pulse 68      Respirations 16      Blood Pressure 149/78 Sitting, Right Arm      Pulse Oximetry 100%, RM            Pain Score      Pain Scale Used:  Numerical      Pain Intensity:  0             Fatigue Score      Fatigue (0-10 scale):  3            EXAM      General Appearance:  Positive for: Alert, Oriented x3, Cooperative;          Negative for: Acute Distress      Eye:  Positive for: Anicteric Sclerae, Moist Conjunctiva      Neck:  Positive for: Supple;          Negative for: JVD, Masses      Respiratory:  Positive for: CTAB, Normal Respiratory Effort      Breast - Left:  Positive for: Appearance (Well-healed surgical incision on the     breast and axilla);          Negative for: Adenopathy, Discharge, Mass, Skin Changes      Breast - Right:  Positive for: Appearance (Normal-appearing female breast);          Negative for: Adenopathy, Discharge, Mass, Skin Changes      Abdomen/Gastro:  Positive for: Normal Active Bowel Sounds, Soft;          Negative for: Distention, Hepatosplenomegaly, Tenderness      Cardiovascular:  Positive for: RRR;          Negative for: Gallop, Murmur, Peripheral Edema, Rub      Psychiatric:  Positive for: Appropriate Affect, Intact Judgement      Lymphatic:  Negative for: Axillary, Cervical, Infraclavicular, Supraclavicular            PREVENTION      Hx Influenza Vaccination:  Yes      Date Influenza Vaccine Given:  Oct 1, 2019      2 or More Falls Past Year?:  No      Fall Past Year with Injury?:  No      Hx Pneumococcal Vaccination:  Yes      Encouraged to follow-up with:  PCP regarding preventative exams.      Chart initiated by      ASHLEY GAMBINO MA            ALLERGY/MEDS      Allergies      Coded Allergies:             No Known Allergies (Unverified , 10/8/19)            Medications      Last Reconciled on 10/8/19 16:36 by NILESH ROSENTHAL      Omega 3 Polyunsat Fatty Acids (Omega-3 Fish Oil) 1 Each Capsule      1 GM PO QDAY, #30 CAP 0 Refills         Reported         10/8/19       Calcium Carbonate (Calcium Antacid) 1,000 Mg Tab.chew      1000 MG PO QDAY, TAB.CHEW         Reported         10/8/19       Multivitamin (Multivitamins) 1 Each Capsule      1 EACH PO  QDAY, CAP         Reported         10/8/19       Tretinoin (Atralin 0.05%) 45 Gm Gel..gram.      1 APL TOPICAL HS, #1 TUBE         Reported         10/8/19       Pantoprazole (Protonix*) 20 Mg Tablet      40 MG PO HS, #60 TAB 0 Refills         Reported         10/8/19       Zoledronic Ac/Mannitol/0.9NACL (Zoledronic Acid 4 mg/100 ml) 4 Mg/100 Ml     Piggyback      4 MG IV ONCE, BOTTLE         Reported         10/8/19       Anastrozole (Anastrozole) 1 Mg Tablet      1 MG PO QDAY, #30 TAB         Reported         10/8/19      Medications Reviewed:  No Changes made to meds            IMPRESSION/PLAN      Diagnosis      Cancer of left breast, stage 1, estrogen receptor positive - C50.912, Z17.0      Patient is on adjuvant hormone therapy.  See no evidence of disease recurrence     by her history of physical examination.  Her next mammogram will be due 12/19.      Order provided today.  Continue Arimidex for a minimum of 5 years.  RTC 3 months    for ongoing surveillance.            Osteopenia         Osteopenia, unspecified location - M85.80         Osteopenia location: unspecified      Patient is on Reclast every 6 months along with calcium vitamin D daily.  We     discussed the need for exercise.  Next Reclast will be due 2/20            Breast screening - Z12.39      Order for mammogram provided      New Diagnostics      * Screening Mammo, 2 Months            Notes      New Medications      * Anastrozole 1 MG TABLET: 1 MG PO QDAY #30      * ZOLEDRONIC AC/MANNITOL/0.9NACL (Zoledronic Acid 4 mg/100 ml) 4 MG/100 ML       PIGGYBACK: 4 MG IV ONCE      * PANTOPRAZOLE (Protonix*) 20 MG TABLET: 40 MG PO HS #60         Instructions: Take on an empty stomach.      * TRETINOIN (Atralin 0.05%) 45 GM GEL..GRAM.: 1 APL TOPICAL HS #1      * Multivitamin (Multivitamins) 1 EACH CAPSULE: 1 EACH PO QDAY      * Calcium Carbonate (Calcium Antacid) 1,000 MG TAB.CHEW: 1,000 MG PO QDAY      * OMEGA 3 POLYUNSAT FATTY ACIDS ([place]-3 Fish Oil)  1 EACH CAPSULE: 1 GM PO QDAY       #30            Patient Education            Eat Well, Exercise Well, Be Well: Dietary and Fitness Guidelines      Exercise (Alternative Therapy)      Patient Education Provided:  Yes                 Disclaimer: Converted document may not contain table formatting or lab diagrams. Please see [employer] for the authenticated document.

## 2021-06-16 ENCOUNTER — HOSPITAL ENCOUNTER (OUTPATIENT)
Dept: BONE DENSITY | Facility: HOSPITAL | Age: 56
Discharge: HOME | End: 2021-06-16
Admitting: INTERNAL MEDICINE

## 2021-06-16 DIAGNOSIS — Z78.0 POST-MENOPAUSAL: ICD-10-CM

## 2021-06-16 PROCEDURE — 77080 DXA BONE DENSITY AXIAL: CPT

## 2021-06-24 PROBLEM — C80.1 CANCER (HCC): Status: ACTIVE | Noted: 2021-06-24

## 2021-06-24 PROBLEM — J30.9 ALLERGIC RHINITIS: Status: ACTIVE | Noted: 2021-06-24

## 2021-06-24 PROBLEM — N32.9 BLADDER DISORDER: Status: ACTIVE | Noted: 2021-06-24

## 2021-06-24 PROBLEM — N63.0 BREAST LUMP: Status: ACTIVE | Noted: 2021-06-24

## 2021-06-24 PROBLEM — K21.9 ESOPHAGEAL REFLUX: Status: ACTIVE | Noted: 2021-06-24

## 2021-07-01 ENCOUNTER — OFFICE VISIT (OUTPATIENT)
Dept: ONCOLOGY | Facility: HOSPITAL | Age: 56
End: 2021-07-01

## 2021-07-01 VITALS
DIASTOLIC BLOOD PRESSURE: 80 MMHG | SYSTOLIC BLOOD PRESSURE: 149 MMHG | WEIGHT: 183.86 LBS | TEMPERATURE: 96.8 F | HEART RATE: 65 BPM | OXYGEN SATURATION: 100 % | RESPIRATION RATE: 18 BRPM | BODY MASS INDEX: 30.6 KG/M2

## 2021-07-01 DIAGNOSIS — Z17.0 MALIGNANT NEOPLASM OF LEFT BREAST IN FEMALE, ESTROGEN RECEPTOR POSITIVE, UNSPECIFIED SITE OF BREAST (HCC): Primary | ICD-10-CM

## 2021-07-01 DIAGNOSIS — C50.912 MALIGNANT NEOPLASM OF LEFT BREAST IN FEMALE, ESTROGEN RECEPTOR POSITIVE, UNSPECIFIED SITE OF BREAST (HCC): Primary | ICD-10-CM

## 2021-07-01 PROBLEM — C80.1 CANCER: Status: RESOLVED | Noted: 2021-06-24 | Resolved: 2021-07-01

## 2021-07-01 PROCEDURE — G0463 HOSPITAL OUTPT CLINIC VISIT: HCPCS | Performed by: INTERNAL MEDICINE

## 2021-07-01 PROCEDURE — 99213 OFFICE O/P EST LOW 20 MIN: CPT | Performed by: INTERNAL MEDICINE

## 2021-07-01 NOTE — ASSESSMENT & PLAN NOTE
Patient is on adjuvant treatment with anastrozole along with adjuvant zoledronic acid.  Tolerating well.  She is now 3-1/2 years into her treatment.  She is up-to-date on mammogram.  I see no evidence of disease recurrence by history or physical examination.  Patient reports that they will be moving to [place] in the near future for her 's  position.  She will let us know if she needs any assistance with transitioning care to that area.  I would plan to continue her adjuvant treatment for a minimum of 5 years.

## 2021-07-01 NOTE — PROGRESS NOTES
Chief Complaint  Breast Cancer and Follow-up    Leno Khoury DO Dugan, Travis Arron, DO Subjective          Sindi Dang presents to [facility]AT[facility] for follow-up and ongoing treatment of her left breast cancer.  She is on adjuvant anastrozole and zoledronic acid.  Tolerating both well.  She denies any problems from either medication.  No dental or jaw pain.  She denies any masses or adenopathy.  No unusual aches or pains.  She reports good appetite and energy level.  She reports that she will be moving to [place] in the near future for her 's  position    Oncology/Hematology History Overview Note   Left breast--Invasive ductal carcinoma with lobular features--ER+ [place]-      HER2- by FISH Ki 67 20% Oncotype DX 25            Clinical Staging      Stage IA (T1N0M0)            Treatments      Chemotherapy      opted out of adj chemotherapy; began Anastrozole on 1/25/18      Radiation Therapy      3/19/18 completed XRT to left lumpectomy site      Surgeries      12/13/17 left lumpectomy         Cancer (CMS/HCC) (Resolved)   6/24/2021 Initial Diagnosis    Cancer (CMS/HCC)     Malignant neoplasm of left breast in female, estrogen receptor positive (CMS/HCC)   7/1/2021 Initial Diagnosis    Malignant neoplasm of left breast in female, estrogen receptor positive (CMS/HCC)     7/1/2021 Cancer Staged    Staging form: Breast, AJCC 8th Edition  - Clinical: cT1, cN0, cM0, ER+, [place]+, HER2- - Signed by Watson Haider MD on 7/1/2021         Review of Systems   Constitutional: Negative for appetite change, diaphoresis, fatigue, fever, unexpected weight gain and unexpected weight loss.   HENT: Negative for hearing loss, mouth sores, sore throat, swollen glands, trouble swallowing and voice change.    Eyes: Negative for blurred vision.   Respiratory: Negative for cough, shortness of breath and wheezing.    Cardiovascular: Negative for chest pain and palpitations.    Gastrointestinal: Negative for abdominal pain, blood in stool, constipation, diarrhea, nausea and vomiting.   Endocrine: Negative for cold intolerance and heat intolerance.   Genitourinary: Negative for difficulty urinating, dysuria, frequency, hematuria and urinary incontinence.   Musculoskeletal: Negative for arthralgias, back pain and myalgias.   Skin: Negative for rash, skin lesions and bruise.   Neurological: Negative for dizziness, seizures, weakness, numbness and headache.   Hematological: Does not bruise/bleed easily.   Psychiatric/Behavioral: Negative for depressed mood. The patient is not nervous/anxious.      Current Outpatient Medications on File Prior to Visit   Medication Sig Dispense Refill   • acetaminophen (TYLENOL) 325 MG tablet      • anastrozole (ARIMIDEX) 1 MG tablet      • Calcium 500-100 MG-UNIT chewable tablet      • clotrimazole (LOTRIMIN) 1 % cream      • Multiple Vitamins-Minerals (Multivitamin Adult Extra C) chewable tablet      • Omega-3 1000 MG capsule      • pantoprazole (PROTONIX) 40 MG EC tablet      • tretinoin (RETIN-A) 0.025 % cream      • zoledronic acid (Reclast) 5 MG/100ML solution infusion      • ZOLMitriptan (ZOMIG) 2.5 MG tablet        No current facility-administered medications on file prior to visit.       No Known Allergies  Past Medical History:   Diagnosis Date   • Breast cancer (CMS/HCC)    • Malignant neoplasm of left breast in female, estrogen receptor positive (CMS/HCC) 7/1/2021   • Thyroid disease      Past Surgical History:   Procedure Laterality Date   • BREAST LUMPECTOMY     • BUNIONECTOMY     • OTHER SURGICAL HISTORY      BIOPSY   • TUBAL ABDOMINAL LIGATION       Social History     Socioeconomic History   • Marital status:      Spouse name: Not on file   • Number of children: 1   • Years of education: Not on file   • Highest education level: Not on file   Tobacco Use   • Smoking status: Never Smoker   Substance and Sexual Activity   • Alcohol use:  Never   • Sexual activity: Defer     Family History   Problem Relation Age of Onset   • Anemia Other        Objective   Physical Exam  Vitals reviewed. Exam conducted with a chaperone present.   Constitutional:       General: She is not in acute distress.     Appearance: Normal appearance.   HENT:      Head: Normocephalic and atraumatic.   Eyes:      Conjunctiva/sclera: Conjunctivae normal.      Pupils: Pupils are equal, round, and reactive to light.   Cardiovascular:      Rate and Rhythm: Normal rate and regular rhythm.      Heart sounds: Normal heart sounds. No murmur heard.   No gallop.    Pulmonary:      Effort: Pulmonary effort is normal.      Breath sounds: Normal breath sounds.   Chest:      Breasts:         Right: Normal. No swelling, bleeding, inverted nipple, mass, nipple discharge, skin change or tenderness.         Left: Normal. No swelling, bleeding, inverted nipple, mass, nipple discharge, skin change or tenderness.       Abdominal:      General: Abdomen is flat. Bowel sounds are normal. There is no distension.      Palpations: Abdomen is soft.      Tenderness: There is no abdominal tenderness.   Musculoskeletal:      Cervical back: Neck supple. No tenderness.      Right lower leg: No edema.      Left lower leg: No edema.   Lymphadenopathy:      Upper Body:      Right upper body: No supraclavicular or axillary adenopathy.      Left upper body: No supraclavicular or axillary adenopathy.   Neurological:      Mental Status: She is oriented to person, place, and time.   Psychiatric:         Mood and Affect: Mood normal.         Behavior: Behavior normal.         Vitals:    07/01/21 1432   BP: 149/80  Comment: RT ARM   Pulse: 65   Resp: 18   Temp: 96.8 °F (36 °C)   TempSrc: Temporal   SpO2: 100%  Comment: rm air   Weight: 83.4 kg (183 lb 13.8 oz)   PainSc: 0-No pain     ECOG score: 0         PHQ-9 Total Score: 0                  Result Review :   The following data was reviewed by: Watson Haider MD on  07/01/2021:  Lab Results   Component Value Date    HGB 13.3 05/05/2020    HCT 40.0 05/05/2020    MCV 89.3 05/05/2020    WBC 5.45 05/05/2020    NEUTROABS 2.20 05/05/2020    LYMPHSABS 2.69 05/05/2020    MONOSABS 0.41 05/05/2020    EOSABS 0.11 05/05/2020    BASOSABS 0.03 05/05/2020     Lab Results   Component Value Date    BUN 11 05/05/2020    CREATININE 0.69 02/24/2021     05/05/2020    K 3.9 05/05/2020     05/05/2020    CO2 26 05/05/2020    CALCIUM 9.6 02/24/2021    PROTEINTOT 7.4 05/05/2020    ALBUMIN 4.1 05/05/2020    BILITOT 0.19 (L) 05/05/2020    ALKPHOS 72 05/05/2020    AST 19 05/05/2020    ALT 25 05/05/2020           Assessment and Plan    Diagnoses and all orders for this visit:    1. Malignant neoplasm of left breast in female, estrogen receptor positive, unspecified site of breast (CMS/HCC) (Primary)  Assessment & Plan:  Patient is on adjuvant treatment with anastrozole along with adjuvant zoledronic acid.  Tolerating well.  She is now 3-1/2 years into her treatment.  She is up-to-date on mammogram.  I see no evidence of disease recurrence by history or physical examination.  Patient reports that they will be moving to [place] in the near future for her 's  position.  She will let us know if she needs any assistance with transitioning care to that area.  I would plan to continue her adjuvant treatment for a minimum of 5 years.      Other orders  -     Cancel: Mammo Screening Bilateral With CAD; Future          Patient Follow Up:    Patient was given instructions and counseling regarding her condition or for health maintenance advice. Please see specific information pulled into the AVS if appropriate.     Watson Haider MD    7/1/2021

## 2022-07-25 ENCOUNTER — TELEPHONE (OUTPATIENT)
Dept: ONCOLOGY | Facility: HOSPITAL | Age: 57
End: 2022-07-25

## 2022-07-25 NOTE — TELEPHONE ENCOUNTER
Caller: Dee Dang    Relationship: Self        What was the call regarding: WANTED TO SEE IF CAN GET BACK IN FOR DR IGLESIAS, HAD MOVED, AND THEN MOVED BACK     UPDATED ADDRESS, SENT EMAIL FOR MY CHART       ADVISED CAN BE SEEN BACK WITH DR IGLESIAS SINCE HAS NOT BEEN OVER 3 YEARS LAST SEEN IN July 2021    DEE SAID SHE WILL CHECK WITH HER INSURANCE  AND SEE IF CAN GET AUTHORIZATION TO BE SEEN AGAIN WITH DR IGLESIAS    IS CONCERNED WANTING TO GET BACK IN FOR HER INFUSIONS.

## 2022-07-29 ENCOUNTER — TELEPHONE (OUTPATIENT)
Dept: ONCOLOGY | Facility: HOSPITAL | Age: 57
End: 2022-07-29

## 2022-07-29 NOTE — TELEPHONE ENCOUNTER
PATIENT HASN'T BEEN SEEN SINCE July 2021, NOTE STATES SHE MOVED OUT OF STATE. LOOKS LIKE SHE WILL NEED TO SEE DR. IGLESIAS BEFORE INFUSION CAN BE SCHEDULED DUE TO NO ORDERS/TREATMENT PLAN ON FILE.

## 2022-07-29 NOTE — TELEPHONE ENCOUNTER
PUT PT ON FOR NEXT AVAILABLE APPT WITH DR. IGLESIAS. SHE STATES SHE IS DUE FOR AN INFUSION ON SEPT 8. SHE PREVIOUSLY MOVED OUT OF STATE, BUT IS BACK SO WILL BE RE-ESTABLISHING CARE. I LET HER KNOW WE WILL ORDER INFUSIONS IF NEEDED AT HER APPT ON 8/23

## 2022-07-29 NOTE — TELEPHONE ENCOUNTER
Caller: Sindi Dang    Relationship to patient: Self    Best call back number: [phone]    Type of visit: FOLLOW UP AND INFUSION    Requested date: ASAP    Additional notes: PLEASE CALL ONCE SCHEDULED.

## 2022-08-23 ENCOUNTER — OFFICE VISIT (OUTPATIENT)
Dept: ONCOLOGY | Facility: HOSPITAL | Age: 57
End: 2022-08-23

## 2022-08-23 ENCOUNTER — LAB (OUTPATIENT)
Dept: ONCOLOGY | Facility: HOSPITAL | Age: 57
End: 2022-08-23

## 2022-08-23 ENCOUNTER — TRANSCRIBE ORDERS (OUTPATIENT)
Dept: ADMINISTRATIVE | Facility: HOSPITAL | Age: 57
End: 2022-08-23

## 2022-08-23 VITALS
DIASTOLIC BLOOD PRESSURE: 100 MMHG | HEIGHT: 65 IN | OXYGEN SATURATION: 97 % | SYSTOLIC BLOOD PRESSURE: 187 MMHG | HEART RATE: 70 BPM | BODY MASS INDEX: 30.6 KG/M2 | TEMPERATURE: 98.1 F | WEIGHT: 183.64 LBS | RESPIRATION RATE: 16 BRPM

## 2022-08-23 DIAGNOSIS — Z17.0 MALIGNANT NEOPLASM OF LEFT BREAST IN FEMALE, ESTROGEN RECEPTOR POSITIVE, UNSPECIFIED SITE OF BREAST: Primary | ICD-10-CM

## 2022-08-23 DIAGNOSIS — Z12.31 ENCOUNTER FOR SCREENING MAMMOGRAM FOR BREAST CANCER: ICD-10-CM

## 2022-08-23 DIAGNOSIS — C50.912 MALIGNANT NEOPLASM OF LEFT BREAST IN FEMALE, ESTROGEN RECEPTOR POSITIVE, UNSPECIFIED SITE OF BREAST: Primary | ICD-10-CM

## 2022-08-23 DIAGNOSIS — C50.912 MALIGNANT NEOPLASM OF LEFT BREAST IN FEMALE, ESTROGEN RECEPTOR POSITIVE, UNSPECIFIED SITE OF BREAST: ICD-10-CM

## 2022-08-23 DIAGNOSIS — Z12.31 SCREENING MAMMOGRAM FOR BREAST CANCER: Primary | ICD-10-CM

## 2022-08-23 DIAGNOSIS — Z17.0 MALIGNANT NEOPLASM OF LEFT BREAST IN FEMALE, ESTROGEN RECEPTOR POSITIVE, UNSPECIFIED SITE OF BREAST: ICD-10-CM

## 2022-08-23 LAB
ALBUMIN SERPL-MCNC: 4.49 G/DL (ref 3.5–5.2)
ALBUMIN/GLOB SERPL: 1.4 G/DL
ALP SERPL-CCNC: 88 U/L (ref 39–117)
ALT SERPL W P-5'-P-CCNC: 27 U/L (ref 1–33)
ANION GAP SERPL CALCULATED.3IONS-SCNC: 14.4 MMOL/L (ref 5–15)
AST SERPL-CCNC: 21 U/L (ref 1–32)
BASOPHILS # BLD AUTO: 0.04 10*3/MM3 (ref 0–0.2)
BASOPHILS NFR BLD AUTO: 0.8 % (ref 0–1.5)
BILIRUB SERPL-MCNC: 0.3 MG/DL (ref 0–1.2)
BUN SERPL-MCNC: 13 MG/DL (ref 6–20)
BUN/CREAT SERPL: 20 (ref 7–25)
CALCIUM SPEC-SCNC: 9.7 MG/DL (ref 8.6–10.5)
CHLORIDE SERPL-SCNC: 103 MMOL/L (ref 98–107)
CO2 SERPL-SCNC: 22.6 MMOL/L (ref 22–29)
CREAT SERPL-MCNC: 0.65 MG/DL (ref 0.57–1)
DEPRECATED RDW RBC AUTO: 40.9 FL (ref 37–54)
EGFRCR SERPLBLD CKD-EPI 2021: 102.2 ML/MIN/1.73
EOSINOPHIL # BLD AUTO: 0.12 10*3/MM3 (ref 0–0.4)
EOSINOPHIL NFR BLD AUTO: 2.3 % (ref 0.3–6.2)
ERYTHROCYTE [DISTWIDTH] IN BLOOD BY AUTOMATED COUNT: 12.7 % (ref 12.3–15.4)
GLOBULIN UR ELPH-MCNC: 3.1 GM/DL
GLUCOSE SERPL-MCNC: 107 MG/DL (ref 65–99)
HCT VFR BLD AUTO: 44 % (ref 34–46.6)
HGB BLD-MCNC: 14.7 G/DL (ref 12–15.9)
IMM GRANULOCYTES # BLD AUTO: 0 10*3/MM3 (ref 0–0.05)
IMM GRANULOCYTES NFR BLD AUTO: 0 % (ref 0–0.5)
LYMPHOCYTES # BLD AUTO: 2.64 10*3/MM3 (ref 0.7–3.1)
LYMPHOCYTES NFR BLD AUTO: 51.4 % (ref 19.6–45.3)
MAGNESIUM SERPL-MCNC: 2.2 MG/DL (ref 1.6–2.6)
MCH RBC QN AUTO: 28.9 PG (ref 26.6–33)
MCHC RBC AUTO-ENTMCNC: 33.4 G/DL (ref 31.5–35.7)
MCV RBC AUTO: 86.6 FL (ref 79–97)
MONOCYTES # BLD AUTO: 0.36 10*3/MM3 (ref 0.1–0.9)
MONOCYTES NFR BLD AUTO: 7 % (ref 5–12)
NEUTROPHILS NFR BLD AUTO: 1.98 10*3/MM3 (ref 1.7–7)
NEUTROPHILS NFR BLD AUTO: 38.5 % (ref 42.7–76)
PHOSPHATE SERPL-MCNC: 4 MG/DL (ref 2.5–4.5)
PLATELET # BLD AUTO: 279 10*3/MM3 (ref 140–450)
PMV BLD AUTO: 10.3 FL (ref 6–12)
POTASSIUM SERPL-SCNC: 3.8 MMOL/L (ref 3.5–5.2)
PROT SERPL-MCNC: 7.6 G/DL (ref 6–8.5)
RBC # BLD AUTO: 5.08 10*6/MM3 (ref 3.77–5.28)
SODIUM SERPL-SCNC: 140 MMOL/L (ref 136–145)
WBC NRBC COR # BLD: 5.14 10*3/MM3 (ref 3.4–10.8)

## 2022-08-23 PROCEDURE — 85025 COMPLETE CBC W/AUTO DIFF WBC: CPT

## 2022-08-23 PROCEDURE — 36415 COLL VENOUS BLD VENIPUNCTURE: CPT

## 2022-08-23 PROCEDURE — 84100 ASSAY OF PHOSPHORUS: CPT

## 2022-08-23 PROCEDURE — G0463 HOSPITAL OUTPT CLINIC VISIT: HCPCS

## 2022-08-23 PROCEDURE — G0463 HOSPITAL OUTPT CLINIC VISIT: HCPCS | Performed by: INTERNAL MEDICINE

## 2022-08-23 PROCEDURE — 80053 COMPREHEN METABOLIC PANEL: CPT

## 2022-08-23 PROCEDURE — 83735 ASSAY OF MAGNESIUM: CPT

## 2022-08-23 PROCEDURE — 99214 OFFICE O/P EST MOD 30 MIN: CPT | Performed by: INTERNAL MEDICINE

## 2022-08-23 RX ORDER — PRENATAL WITH FERROUS FUM AND FOLIC ACID 3080; 920; 120; 400; 22; 1.84; 3; 20; 10; 1; 12; 200; 27; 25; 2 [IU]/1; [IU]/1; MG/1; [IU]/1; MG/1; MG/1; MG/1; MG/1; MG/1; MG/1; UG/1; MG/1; MG/1; MG/1; MG/1
TABLET ORAL
COMMUNITY
Start: 2022-06-02

## 2022-08-23 RX ORDER — SODIUM CHLORIDE 9 MG/ML
250 INJECTION, SOLUTION INTRAVENOUS ONCE
Status: CANCELLED | OUTPATIENT
Start: 2022-09-08

## 2022-08-23 NOTE — PROGRESS NOTES
Chief Complaint  Breast Cancer    Leno Khoury DO Dugan, Travis Arron, DO Subjective          Sindi Dang presents to [facility]EMATOLOG[facility] ONCOL[facility]Y for ongoing treatment of breast cancer.  She has recently moved to this area from [place].  She continues to take anastrozole daily and zoledronic acid every 6 months.  She denies issues from either medication.  She denies dental or jaw pain.  No new masses or adenopathy, no unusual aches or pains.  She reports adequate appetite and energy level.    Oncology/Hematology History Overview Note   Left breast--Invasive ductal carcinoma with lobular features--ER+ [place]-      HER2- by FISH Ki 67 20% Oncotype DX 25            Clinical Staging      Stage IA (T1N0M0)            Treatments      Chemotherapy      opted out of adj chemotherapy; began Anastrozole on 1/25/18      Radiation Therapy      3/19/18 completed XRT to left lumpectomy site      Surgeries      12/13/17 left lumpectomy         Cancer (HCC) (Resolved)   6/24/2021 Initial Diagnosis    Cancer (CMS/HCC)     Malignant neoplasm of left breast in female, estrogen receptor positive (HCC)   7/1/2021 Initial Diagnosis    Malignant neoplasm of left breast in female, estrogen receptor positive (CMS/HCC)     7/1/2021 Cancer Staged    Staging form: Breast, AJCC 8th Edition  - Clinical: cT1, cN0, cM0, ER+, [place]+, HER2- - Signed by Watson Haider MD on 7/1/2021 8/23/2022 -  Chemotherapy    OP SUPPORTIVE Zoledronic Acid Q6M         Review of Systems   Constitutional: Positive for fatigue. Negative for appetite change, diaphoresis, fever, unexpected weight gain and unexpected weight loss.   HENT: Negative for hearing loss, mouth sores, sore throat, swollen glands, trouble swallowing and voice change.    Eyes: Negative for blurred vision.   Respiratory: Negative for cough, shortness of breath and wheezing.    Cardiovascular: Negative for chest pain and palpitations.   Gastrointestinal: Negative  for abdominal pain, blood in stool, constipation, diarrhea, nausea and vomiting.   Endocrine: Negative for cold intolerance and heat intolerance.   Genitourinary: Negative for difficulty urinating, dysuria, frequency, hematuria and urinary incontinence.   Musculoskeletal: Negative for arthralgias, back pain and myalgias.   Skin: Negative for rash, skin lesions and wound.   Neurological: Negative for dizziness, seizures, weakness, numbness and headache.   Hematological: Does not bruise/bleed easily.   Psychiatric/Behavioral: Negative for depressed mood. The patient is not nervous/anxious.      Current Outpatient Medications on File Prior to Visit   Medication Sig Dispense Refill   • acetaminophen (TYLENOL) 325 MG tablet      • anastrozole (ARIMIDEX) 1 MG tablet      • Calcium 500-100 MG-UNIT chewable tablet      • clotrimazole (LOTRIMIN) 1 % cream      • Multiple Vitamins-Minerals (Multivitamin Adult Extra C) chewable tablet      • Omega-3 1000 MG capsule      • pantoprazole (PROTONIX) 40 MG EC tablet      • Prenatal Vit-Fe Fumarate-FA (Prenatal 27-1) 27-1 MG tablet tablet      • tretinoin (RETIN-A) 0.025 % cream      • zoledronic acid (RECLAST) 5 MG/100ML solution infusion      • ZOLMitriptan (ZOMIG) 2.5 MG tablet        No current facility-administered medications on file prior to visit.       No Known Allergies  Past Medical History:   Diagnosis Date   • Breast cancer (HCC)    • Malignant neoplasm of left breast in female, estrogen receptor positive (HCC) 7/1/2021   • Thyroid disease      Past Surgical History:   Procedure Laterality Date   • BREAST LUMPECTOMY     • BUNIONECTOMY     • OTHER SURGICAL HISTORY      BIOPSY   • TUBAL ABDOMINAL LIGATION       Social History     Socioeconomic History   • Marital status:    • Number of children: 1   Tobacco Use   • Smoking status: Never Smoker   Substance and Sexual Activity   • Alcohol use: Never   • Sexual activity: Defer     Family History   Problem Relation Age  "of Onset   • Anemia Other        Objective   Physical Exam  Vitals reviewed. Exam conducted with a chaperone present.   Constitutional:       General: She is not in acute distress.     Appearance: Normal appearance.   Cardiovascular:      Rate and Rhythm: Normal rate and regular rhythm.      Heart sounds: Normal heart sounds. No murmur heard.    No gallop.   Pulmonary:      Effort: Pulmonary effort is normal.      Breath sounds: Normal breath sounds.   Chest:   Breasts:      Right: Normal. No swelling, bleeding, inverted nipple, mass, nipple discharge, skin change, tenderness, axillary adenopathy or supraclavicular adenopathy.      Left: Skin change (See diagram) present. No axillary adenopathy or supraclavicular adenopathy.         Abdominal:      General: Abdomen is flat. Bowel sounds are normal.      Palpations: Abdomen is soft.   Musculoskeletal:      Cervical back: Neck supple.      Right lower leg: No edema.      Left lower leg: No edema.   Lymphadenopathy:      Cervical: No cervical adenopathy.      Upper Body:      Right upper body: No supraclavicular or axillary adenopathy.      Left upper body: No supraclavicular or axillary adenopathy.   Neurological:      Mental Status: She is alert and oriented to person, place, and time.   Psychiatric:         Mood and Affect: Mood normal.         Behavior: Behavior normal.         Vitals:    08/23/22 0853   BP: (!) 187/100   Pulse: 70   Resp: 16   Temp: 98.1 °F (36.7 °C)   SpO2: 97%   Weight: 83.3 kg (183 lb 10.3 oz)   Height: 165.1 cm (65\")   PainSc:   3   PainLoc: Generalized     ECOG score: 0         PHQ-9 Total Score:                    Result Review :   The following data was reviewed by: Watson Haider MD on 08/23/2022:  Lab Results   Component Value Date    HGB 14.7 08/23/2022    HCT 44.0 08/23/2022    MCV 86.6 08/23/2022     08/23/2022    WBC 5.14 08/23/2022    NEUTROABS 1.98 08/23/2022    LYMPHSABS 2.64 08/23/2022    MONOSABS 0.36 08/23/2022    " EOSABS 0.12 08/23/2022    BASOSABS 0.04 08/23/2022     Lab Results   Component Value Date    GLUCOSE 107 (H) 08/23/2022    BUN 13 08/23/2022    CREATININE 0.65 08/23/2022     08/23/2022    K 3.8 08/23/2022     08/23/2022    CO2 22.6 08/23/2022    CALCIUM 9.7 08/23/2022    PROTEINTOT 7.6 08/23/2022    ALBUMIN 4.49 08/23/2022    BILITOT 0.3 08/23/2022    ALKPHOS 88 08/23/2022    AST 21 08/23/2022    ALT 27 08/23/2022     Lab Results   Component Value Date    MG 2.2 08/23/2022    PHOS 4.0 08/23/2022       Data reviewed: Radiologic studies Mammogram reviewed      Assessment and Plan    Diagnoses and all orders for this visit:    1. Malignant neoplasm of left breast in female, estrogen receptor positive, unspecified site of breast (HCC) (Primary)  Assessment & Plan:  Patient has recently returned to this area from [place].  She is on adjuvant anastrozole and zoledronic acid.  I will request her records from her oncologist in [place].  I see no evidence of disease recurrence per history or physical examination.  Continue anastrozole for minimum of 5 years.  For now I will continue zoledronic acid every 6 months although the original study looked at no more than 2 to 3 years of adjuvant zoledronic acid.  I will review her records from [place] prior to make any changes.  I will see her back in December when she is due for her screening mammogram.    Orders:  -     CBC and Differential; Future  -     Comprehensive metabolic panel; Future  -     Magnesium; Future  -     Phosphorus; Future  -     Lab Appointment Request; Future  -     Clinic Appointment Request; Future  -     Infusion Appointment Request 3; Future    2. Encounter for screening mammogram for breast cancer  -     Cancel: Mammo Screening Bilateral With CAD; Future          Patient Follow Up: December    Patient was given instructions and counseling regarding her condition or for health maintenance advice. Please see specific information pulled into the AVS  if appropriate.     Watson Haider MD    8/23/2022

## 2022-08-23 NOTE — ASSESSMENT & PLAN NOTE
Patient has recently returned to this area from [place].  She is on adjuvant anastrozole and zoledronic acid.  I will request her records from her oncologist in [place].  I see no evidence of disease recurrence per history or physical examination.  Continue anastrozole for minimum of 5 years.  For now I will continue zoledronic acid every 6 months although the original study looked at no more than 2 to 3 years of adjuvant zoledronic acid.  I will review her records from [place] prior to make any changes.  I will see her back in December when she is due for her screening mammogram.

## 2022-08-23 NOTE — PROGRESS NOTES
Chief Complaint  Breast Cancer    Iraida, Leno Guthrie,   Iraida, Leno Guthrie, DO    Subjective     {Problem List  Visit Diagnosis   Encounters  Notes  Medications  Labs  Result Review Imaging  Media :23}     Sindi Dang presents to [facility] HEMATOLOG[facility]Y for ***    Oncology/Hematology History Overview Note   Left breast--Invasive ductal carcinoma with lobular features--ER+ [place]-      HER2- by FISH Ki 67 20% Oncotype DX 25            Clinical Staging      Stage IA (T1N0M0)            Treatments      Chemotherapy      opted out of adj chemotherapy; began Anastrozole on 1/25/18      Radiation Therapy      3/19/18 completed XRT to left lumpectomy site      Surgeries      12/13/17 left lumpectomy         Cancer (HCC) (Resolved)   6/24/2021 Initial Diagnosis    Cancer (CMS/HCC)     Malignant neoplasm of left breast in female, estrogen receptor positive (HCC)   7/1/2021 Initial Diagnosis    Malignant neoplasm of left breast in female, estrogen receptor positive (CMS/HCC)     7/1/2021 Cancer Staged    Staging form: Breast, AJCC 8th Edition  - Clinical: cT1, cN0, cM0, ER+, [place]+, HER2- - Signed by Watson Haider MD on 7/1/2021         Review of Systems   Constitutional: Negative for appetite change, diaphoresis, fatigue, fever, unexpected weight gain and unexpected weight loss.   HENT: Negative for hearing loss, mouth sores, sore throat, swollen glands, trouble swallowing and voice change.    Eyes: Negative for blurred vision.   Respiratory: Negative for cough, shortness of breath and wheezing.    Cardiovascular: Negative for chest pain and palpitations.   Gastrointestinal: Negative for abdominal pain, blood in stool, constipation, diarrhea, nausea and vomiting.   Endocrine: Negative for cold intolerance and heat intolerance.   Genitourinary: Negative for difficulty urinating, dysuria, frequency, hematuria and urinary incontinence.   Musculoskeletal: Negative for arthralgias, back pain  and myalgias.   Skin: Negative for rash, skin lesions and wound.   Neurological: Negative for dizziness, seizures, weakness, numbness and headache.   Hematological: Does not bruise/bleed easily.   Psychiatric/Behavioral: Negative for depressed mood. The patient is not nervous/anxious.      Current Outpatient Medications on File Prior to Visit   Medication Sig Dispense Refill   • acetaminophen (TYLENOL) 325 MG tablet      • anastrozole (ARIMIDEX) 1 MG tablet      • Calcium 500-100 MG-UNIT chewable tablet      • clotrimazole (LOTRIMIN) 1 % cream      • Multiple Vitamins-Minerals (Multivitamin Adult Extra C) chewable tablet      • Omega-3 1000 MG capsule      • pantoprazole (PROTONIX) 40 MG EC tablet      • Prenatal Vit-Fe Fumarate-FA (Prenatal 27-1) 27-1 MG tablet tablet      • tretinoin (RETIN-A) 0.025 % cream      • zoledronic acid (RECLAST) 5 MG/100ML solution infusion      • ZOLMitriptan (ZOMIG) 2.5 MG tablet        No current facility-administered medications on file prior to visit.       No Known Allergies  Past Medical History:   Diagnosis Date   • Breast cancer (HCC)    • Malignant neoplasm of left breast in female, estrogen receptor positive (HCC) 7/1/2021   • Thyroid disease      Past Surgical History:   Procedure Laterality Date   • BREAST LUMPECTOMY     • BUNIONECTOMY     • OTHER SURGICAL HISTORY      BIOPSY   • TUBAL ABDOMINAL LIGATION       Social History     Socioeconomic History   • Marital status:    • Number of children: 1   Tobacco Use   • Smoking status: Never Smoker   Substance and Sexual Activity   • Alcohol use: Never   • Sexual activity: Defer     Family History   Problem Relation Age of Onset   • Anemia Other        Objective   Physical Exam    There were no vitals filed for this visit.  ECOG score: 0         PHQ-9 Total Score:         {The patient is/is not experiencing fatigue. (Optional):25086}   {PT fx screen 1 (Optional):30437}  {Speech Functional Screening  (Optional):66072}  {Rehab to be ordered (Optional):61296}    Result Review :{Labs  Result Review  Imaging  Med Tab  Media  Procedures :23}   The following data was reviewed by: Majo Galicia MA on 08/23/2022:  Lab Results   Component Value Date    HGB 13.3 05/05/2020    HCT 40.0 05/05/2020    MCV 89.3 05/05/2020    WBC 5.45 05/05/2020    NEUTROABS 2.20 05/05/2020    LYMPHSABS 2.69 05/05/2020    MONOSABS 0.41 05/05/2020    EOSABS 0.11 05/05/2020    BASOSABS 0.03 05/05/2020     Lab Results   Component Value Date    GLUCOSE 109 (H) 05/05/2020    BUN 11 05/05/2020    CREATININE 0.69 02/24/2021     05/05/2020    K 3.9 05/05/2020     05/05/2020    CO2 26 05/05/2020    CALCIUM 9.6 02/24/2021    PROTEINTOT 7.4 05/05/2020    ALBUMIN 4.1 05/05/2020    BILITOT 0.19 (L) 05/05/2020    ALKPHOS 72 05/05/2020    AST 19 05/05/2020    ALT 25 05/05/2020     No results found for: MG, PHOS, FREET4, TSH    {Data reviewed (Optional):24975:::1}      Assessment and Plan {CC Problem List  Visit Diagnosis   ROS  Review (Popup)  Health Maintenance  Quality  BestPractice  Medications  SmartSets  SnapShot Encounters  Media :23}   There are no diagnoses linked to this encounter.    {Time Spent (Optional):18440}    Patient Follow Up: ***  Patient was given instructions and counseling regarding her condition or for health maintenance advice. Please see specific information pulled into the AVS if appropriate.     Majo Galicia MA    8/23/2022

## 2022-09-08 ENCOUNTER — HOSPITAL ENCOUNTER (OUTPATIENT)
Dept: ONCOLOGY | Facility: HOSPITAL | Age: 57
Setting detail: INFUSION SERIES
Discharge: HOME | End: 2022-09-08

## 2022-09-08 VITALS
DIASTOLIC BLOOD PRESSURE: 79 MMHG | TEMPERATURE: 96.8 F | OXYGEN SATURATION: 99 % | HEART RATE: 68 BPM | SYSTOLIC BLOOD PRESSURE: 151 MMHG | RESPIRATION RATE: 18 BRPM

## 2022-09-08 DIAGNOSIS — C50.912 MALIGNANT NEOPLASM OF LEFT BREAST IN FEMALE, ESTROGEN RECEPTOR POSITIVE, UNSPECIFIED SITE OF BREAST: Primary | ICD-10-CM

## 2022-09-08 DIAGNOSIS — Z17.0 MALIGNANT NEOPLASM OF LEFT BREAST IN FEMALE, ESTROGEN RECEPTOR POSITIVE, UNSPECIFIED SITE OF BREAST: Primary | ICD-10-CM

## 2022-09-08 PROCEDURE — 96374 THER/PROPH/DIAG INJ IV PUSH: CPT

## 2022-09-08 PROCEDURE — 25010000002 ZOLEDRONIC ACID 4 MG/100ML SOLUTION: Performed by: INTERNAL MEDICINE

## 2022-09-08 RX ORDER — SODIUM CHLORIDE 9 MG/ML
250 INJECTION, SOLUTION INTRAVENOUS ONCE
Status: COMPLETED | OUTPATIENT
Start: 2022-09-08 | End: 2022-09-08

## 2022-09-08 RX ORDER — ZOLEDRONIC ACID 0.04 MG/ML
4 INJECTION, SOLUTION INTRAVENOUS ONCE
Status: COMPLETED | OUTPATIENT
Start: 2022-09-08 | End: 2022-09-08

## 2022-09-08 RX ADMIN — ZOLEDRONIC ACID 4 MG: 0.04 INJECTION, SOLUTION INTRAVENOUS at 15:22

## 2022-09-08 RX ADMIN — SODIUM CHLORIDE 250 ML: 9 INJECTION, SOLUTION INTRAVENOUS at 15:22

## 2022-10-27 ENCOUNTER — TELEPHONE (OUTPATIENT)
Dept: ONCOLOGY | Facility: HOSPITAL | Age: 57
End: 2022-10-27

## 2022-10-27 DIAGNOSIS — M79.605 LEFT LEG PAIN: Primary | ICD-10-CM

## 2022-10-27 NOTE — TELEPHONE ENCOUNTER
Caller: Sindi Dang    Relationship: Self    Best call back number: [phone]      What was the call regarding: PATIENT CALLED STATED THAT AFTER HER LAST TREATMENT SHE HAS BEEN HAVING LEFT LEG PAIN AND SWELLING AND ITS STILL BOTHERING HER    Do you require a callback: YES

## 2022-10-28 ENCOUNTER — APPOINTMENT (OUTPATIENT)
Dept: ULTRASOUND IMAGING | Facility: HOSPITAL | Age: 57
End: 2022-10-28

## 2022-10-28 ENCOUNTER — HOSPITAL ENCOUNTER (OUTPATIENT)
Dept: CARDIOLOGY | Facility: HOSPITAL | Age: 57
Discharge: HOME | End: 2022-10-28
Admitting: INTERNAL MEDICINE

## 2022-10-28 DIAGNOSIS — M79.605 LEFT LEG PAIN: ICD-10-CM

## 2022-10-28 LAB
BH CV LOW VAS LEFT POPLITEAL SPONT: 1
BH CV LOWER VASCULAR LEFT COMMON FEMORAL AUGMENT: NORMAL
BH CV LOWER VASCULAR LEFT COMMON FEMORAL COMPETENT: NORMAL
BH CV LOWER VASCULAR LEFT COMMON FEMORAL COMPRESS: NORMAL
BH CV LOWER VASCULAR LEFT COMMON FEMORAL PHASIC: NORMAL
BH CV LOWER VASCULAR LEFT COMMON FEMORAL SPONT: NORMAL
BH CV LOWER VASCULAR LEFT DISTAL FEMORAL COMPRESS: NORMAL
BH CV LOWER VASCULAR LEFT GASTRONEMIUS COMPRESS: NORMAL
BH CV LOWER VASCULAR LEFT GREATER SAPH AK COMPRESS: NORMAL
BH CV LOWER VASCULAR LEFT GREATER SAPH BK COMPRESS: NORMAL
BH CV LOWER VASCULAR LEFT LESSER SAPH COMPRESS: NORMAL
BH CV LOWER VASCULAR LEFT MID FEMORAL AUGMENT: NORMAL
BH CV LOWER VASCULAR LEFT MID FEMORAL COMPETENT: NORMAL
BH CV LOWER VASCULAR LEFT MID FEMORAL COMPRESS: NORMAL
BH CV LOWER VASCULAR LEFT MID FEMORAL PHASIC: NORMAL
BH CV LOWER VASCULAR LEFT MID FEMORAL SPONT: NORMAL
BH CV LOWER VASCULAR LEFT PERONEAL COMPRESS: NORMAL
BH CV LOWER VASCULAR LEFT POPLITEAL AUGMENT: NORMAL
BH CV LOWER VASCULAR LEFT POPLITEAL COMPETENT: NORMAL
BH CV LOWER VASCULAR LEFT POPLITEAL COMPRESS: NORMAL
BH CV LOWER VASCULAR LEFT POPLITEAL PHASIC: NORMAL
BH CV LOWER VASCULAR LEFT POPLITEAL SPONT: NORMAL
BH CV LOWER VASCULAR LEFT POSTERIOR TIBIAL COMPRESS: NORMAL
BH CV LOWER VASCULAR LEFT PROXIMAL FEMORAL COMPRESS: NORMAL
BH CV LOWER VASCULAR LEFT SAPHENOFEMORAL JUNCTION COMPRESS: NORMAL
BH CV LOWER VASCULAR RIGHT COMMON FEMORAL AUGMENT: NORMAL
BH CV LOWER VASCULAR RIGHT COMMON FEMORAL COMPETENT: NORMAL
BH CV LOWER VASCULAR RIGHT COMMON FEMORAL COMPRESS: NORMAL
BH CV LOWER VASCULAR RIGHT COMMON FEMORAL PHASIC: NORMAL
BH CV LOWER VASCULAR RIGHT COMMON FEMORAL SPONT: NORMAL
BH CV POP FLUID COLLECT LEFT: 1
MAXIMAL PREDICTED HEART RATE: 162 BPM
STRESS TARGET HR: 138 BPM

## 2022-10-28 PROCEDURE — 93971 EXTREMITY STUDY: CPT | Performed by: SURGERY

## 2022-10-28 PROCEDURE — 93971 EXTREMITY STUDY: CPT

## 2022-10-31 ENCOUNTER — TELEPHONE (OUTPATIENT)
Dept: ONCOLOGY | Facility: HOSPITAL | Age: 57
End: 2022-10-31

## 2022-12-28 ENCOUNTER — HOSPITAL ENCOUNTER (OUTPATIENT)
Dept: MAMMOGRAPHY | Facility: HOSPITAL | Age: 57
Discharge: HOME | End: 2022-12-28
Admitting: INTERNAL MEDICINE

## 2022-12-28 ENCOUNTER — APPOINTMENT (OUTPATIENT)
Dept: MAMMOGRAPHY | Facility: HOSPITAL | Age: 57
End: 2022-12-28

## 2022-12-28 DIAGNOSIS — Z12.31 SCREENING MAMMOGRAM FOR BREAST CANCER: ICD-10-CM

## 2022-12-28 PROCEDURE — 77067 SCR MAMMO BI INCL CAD: CPT

## 2022-12-28 PROCEDURE — 77063 BREAST TOMOSYNTHESIS BI: CPT

## 2023-01-03 ENCOUNTER — OFFICE VISIT (OUTPATIENT)
Dept: ONCOLOGY | Facility: HOSPITAL | Age: 58
End: 2023-01-03
Payer: OTHER GOVERNMENT

## 2023-01-03 VITALS
RESPIRATION RATE: 16 BRPM | TEMPERATURE: 98.5 F | WEIGHT: 184.75 LBS | BODY MASS INDEX: 30.74 KG/M2 | SYSTOLIC BLOOD PRESSURE: 160 MMHG | HEART RATE: 74 BPM | DIASTOLIC BLOOD PRESSURE: 77 MMHG | OXYGEN SATURATION: 98 %

## 2023-01-03 DIAGNOSIS — Z17.0 MALIGNANT NEOPLASM OF LEFT BREAST IN FEMALE, ESTROGEN RECEPTOR POSITIVE, UNSPECIFIED SITE OF BREAST: Primary | ICD-10-CM

## 2023-01-03 DIAGNOSIS — Z78.0 POST-MENOPAUSAL: ICD-10-CM

## 2023-01-03 DIAGNOSIS — C50.912 MALIGNANT NEOPLASM OF LEFT BREAST IN FEMALE, ESTROGEN RECEPTOR POSITIVE, UNSPECIFIED SITE OF BREAST: Primary | ICD-10-CM

## 2023-01-03 PROCEDURE — 1126F AMNT PAIN NOTED NONE PRSNT: CPT | Performed by: INTERNAL MEDICINE

## 2023-01-03 PROCEDURE — G0463 HOSPITAL OUTPT CLINIC VISIT: HCPCS

## 2023-01-03 PROCEDURE — 99214 OFFICE O/P EST MOD 30 MIN: CPT | Performed by: INTERNAL MEDICINE

## 2023-01-03 PROCEDURE — 1159F MED LIST DOCD IN RCRD: CPT | Performed by: INTERNAL MEDICINE

## 2023-01-03 NOTE — PROGRESS NOTES
Chief Complaint  Breast Cancer    Ray, Maru NILESH Dang presents to [facility] for follow-up of breast cancer.  She is on adjuvant anastrozole and zoledronic acid.  She is now 5 years into her treatment.  She denies dental or jaw pain.  She reports some left knee pain- ultrasound demonstrates a popliteal cyst.  Patient reports x-ray via primary care which was \"negative\".  She continues to have pain from the cyst.  She denies new breast masses or adenopathy.  No unusual aches or pains.  She denies hot flashes or night sweats.    Oncology/Hematology History Overview Note   Left breast--Invasive ductal carcinoma with lobular features--ER+ PA-      HER2- by FISH Ki 67 20% Oncotype DX 25            Clinical Staging      Stage IA (T1N0M0)            Treatments      Chemotherapy      opted out of adj chemotherapy; began Anastrozole on 1/25/18      Radiation Therapy      3/19/18 completed XRT to left lumpectomy site      Surgeries      12/13/17 left lumpectomy      1/3/2023 Patient decided to continue Anastrazole for a total of 7 years.  Zolendronic Acid discontinued.     Cancer (HCC) (Resolved)   6/24/2021 Initial Diagnosis    Cancer (CMS/HCC)     Malignant neoplasm of left breast in female, estrogen receptor positive (HCC)   7/1/2021 Initial Diagnosis    Malignant neoplasm of left breast in female, estrogen receptor positive (CMS/HCC)     7/1/2021 Cancer Staged    Staging form: Breast, AJCC 8th Edition  - Clinical: cT1, cN0, cM0, ER+, PA+, HER2- - Signed by Watson Haider MD on 7/1/2021 9/8/2022 - 9/8/2022 Chemotherapy    OP SUPPORTIVE Zoledronic Acid Q6M         Review of Systems   Constitutional: Positive for fatigue. Negative for appetite change, diaphoresis, fever, unexpected weight gain and unexpected weight loss.   HENT: Negative for hearing loss, mouth sores, sore throat, swollen glands, trouble swallowing and voice change.    Eyes:  Negative for blurred vision.   Respiratory: Negative for cough, shortness of breath and wheezing.    Cardiovascular: Negative for chest pain and palpitations.   Gastrointestinal: Negative for abdominal pain, blood in stool, constipation, diarrhea, nausea and vomiting.   Endocrine: Negative for cold intolerance and heat intolerance.   Genitourinary: Negative for difficulty urinating, dysuria, frequency, hematuria and urinary incontinence.   Musculoskeletal: Negative for arthralgias, back pain and myalgias.   Skin: Negative for rash, skin lesions and wound.   Neurological: Negative for dizziness, seizures, weakness, numbness and headache.   Hematological: Does not bruise/bleed easily.   Psychiatric/Behavioral: Negative for depressed mood. The patient is not nervous/anxious.      Current Outpatient Medications on File Prior to Visit   Medication Sig Dispense Refill   • acetaminophen (TYLENOL) 325 MG tablet      • anastrozole (ARIMIDEX) 1 MG tablet      • Calcium 500-100 MG-UNIT chewable tablet      • clotrimazole (LOTRIMIN) 1 % cream      • Multiple Vitamins-Minerals (Multivitamin Adult Extra C) chewable tablet      • Omega-3 1000 MG capsule      • pantoprazole (PROTONIX) 40 MG EC tablet      • Prenatal Vit-Fe Fumarate-FA (Prenatal 27-1) 27-1 MG tablet tablet      • tretinoin (RETIN-A) 0.025 % cream      • zoledronic acid (RECLAST) 5 MG/100ML solution infusion      • ZOLMitriptan (ZOMIG) 2.5 MG tablet        No current facility-administered medications on file prior to visit.       No Known Allergies  Past Medical History:   Diagnosis Date   • Breast cancer (HCC)    • Malignant neoplasm of left breast in female, estrogen receptor positive (HCC) 7/1/2021   • Thyroid disease      Past Surgical History:   Procedure Laterality Date   • BREAST LUMPECTOMY     • BUNIONECTOMY     • OTHER SURGICAL HISTORY      BIOPSY   • TUBAL ABDOMINAL LIGATION       Social History     Socioeconomic History   • Marital status:    •  Number of children: 1   Tobacco Use   • Smoking status: Never   Substance and Sexual Activity   • Alcohol use: Never   • Sexual activity: Defer     Family History   Problem Relation Age of Onset   • Anemia Other        Objective   Physical Exam  Vitals reviewed. Exam conducted with a chaperone present.   Constitutional:       General: She is not in acute distress.     Appearance: Normal appearance.   Cardiovascular:      Rate and Rhythm: Normal rate and regular rhythm.      Heart sounds: Normal heart sounds. No murmur heard.    No gallop.   Pulmonary:      Effort: Pulmonary effort is normal.      Breath sounds: Normal breath sounds.   Chest:   Breasts:     Right: Normal.      Left: Skin change (See diagram) present.       Abdominal:      General: Abdomen is flat. Bowel sounds are normal. There is no distension.      Palpations: Abdomen is soft.      Tenderness: There is no abdominal tenderness.   Musculoskeletal:      Cervical back: Neck supple.      Right lower leg: No edema.      Left lower leg: No edema.   Lymphadenopathy:      Cervical: No cervical adenopathy.      Upper Body:      Right upper body: No supraclavicular or axillary adenopathy.      Left upper body: No supraclavicular or axillary adenopathy.   Neurological:      Mental Status: She is alert and oriented to person, place, and time.   Psychiatric:         Mood and Affect: Mood normal.         Behavior: Behavior normal.         Vitals:    01/03/23 1300   BP: 160/77   Pulse: 74   Resp: 16   Temp: 98.5 °F (36.9 °C)   TempSrc: Temporal   SpO2: 98%   Weight: 83.8 kg (184 lb 11.9 oz)   PainSc: 0-No pain     ECOG score: 0         PHQ-9 Total Score:                    Result Review :   The following data was reviewed by: Watson Haider MD on 01/03/2023:  Lab Results   Component Value Date    HGB 14.7 08/23/2022    HCT 44.0 08/23/2022    MCV 86.6 08/23/2022     08/23/2022    WBC 5.14 08/23/2022    NEUTROABS 1.98 08/23/2022    LYMPHSABS 2.64  08/23/2022    MONOSABS 0.36 08/23/2022    EOSABS 0.12 08/23/2022    BASOSABS 0.04 08/23/2022     Lab Results   Component Value Date    GLUCOSE 107 (H) 08/23/2022    BUN 13 08/23/2022    CREATININE 0.65 08/23/2022     08/23/2022    K 3.8 08/23/2022     08/23/2022    CO2 22.6 08/23/2022    CALCIUM 9.7 08/23/2022    PROTEINTOT 7.6 08/23/2022    ALBUMIN 4.49 08/23/2022    BILITOT 0.3 08/23/2022    ALKPHOS 88 08/23/2022    AST 21 08/23/2022    ALT 27 08/23/2022     Lab Results   Component Value Date    MG 2.2 08/23/2022    PHOS 4.0 08/23/2022       Data reviewed: Radiologic studies Mammogram reviewed and benign      Assessment and Plan    Diagnoses and all orders for this visit:    1. Malignant neoplasm of left breast in female, estrogen receptor positive, unspecified site of breast (HCC) (Primary)  Assessment & Plan:  Patient is on adjuvant anastrozole.  This month completes 5 years of therapy.  Patient tolerating well.  Patient is interested in continuing treatment.  We discussed that the original studies looked in 5 years but there are ongoing trials looking at longer.  Given her good tolerance of the medication, continue treatment for 2 years would be reasonable with anastrozole.  She has completed 5 years of zoledronic acid.  The original adjuvant trial looked in 3 years.  She is beyond that dk.  Her most recent DEXA scan is normal.  I will stop zoledronic acid.  I will see her back in 6 months for ongoing treatment.      2. Post-menopausal  Assessment & Plan:  DEXA scan will be obtained    Orders:  -     DEXA Bone Density Axial; Future          Patient Follow Up: 6 months    Patient was given instructions and counseling regarding her condition or for health maintenance advice. Please see specific information pulled into the AVS if appropriate.     Watson Haider MD    1/3/2023

## 2023-01-03 NOTE — ASSESSMENT & PLAN NOTE
Patient is on adjuvant anastrozole.  This month completes 5 years of therapy.  Patient tolerating well.  Patient is interested in continuing treatment.  We discussed that the original studies looked in 5 years but there are ongoing trials looking at longer.  Given her good tolerance of the medication, continue treatment for 2 years would be reasonable with anastrozole.  She has completed 5 years of zoledronic acid.  The original adjuvant trial looked in 3 years.  She is beyond that dk.  Her most recent DEXA scan is normal.  I will stop zoledronic acid.  I will see her back in 6 months for ongoing treatment.

## 2023-01-19 ENCOUNTER — OFFICE VISIT (OUTPATIENT)
Dept: ORTHOPEDIC SURGERY | Facility: CLINIC | Age: 58
End: 2023-01-19
Payer: OTHER GOVERNMENT

## 2023-01-19 VITALS — BODY MASS INDEX: 30.66 KG/M2 | HEIGHT: 65 IN | WEIGHT: 184 LBS

## 2023-01-19 DIAGNOSIS — M17.12 PRIMARY OSTEOARTHRITIS OF LEFT KNEE: ICD-10-CM

## 2023-01-19 DIAGNOSIS — M25.462 EFFUSION OF LEFT KNEE: ICD-10-CM

## 2023-01-19 DIAGNOSIS — M71.22 BAKER'S CYST OF KNEE, LEFT: Primary | ICD-10-CM

## 2023-01-19 PROCEDURE — 99203 OFFICE O/P NEW LOW 30 MIN: CPT | Performed by: ORTHOPAEDIC SURGERY

## 2023-01-19 NOTE — PROGRESS NOTES
"Chief Complaint  Pain and Initial Evaluation of the Left Knee     Subjective      Sindi Dang presents to [facility] for evaluation of the left knee. The patient reports left knee pain. She was treated for breast cancer. She reports she had swelling in the knee that she developed after an osteoporosis infusion in September. She denies injury or trauma. She reports some swelling has improved.     No Known Allergies     Social History     Socioeconomic History   • Marital status:    • Number of children: 1   Tobacco Use   • Smoking status: Never   • Smokeless tobacco: Never   Substance and Sexual Activity   • Alcohol use: Never   • Sexual activity: Defer        Review of Systems     Objective   Vital Signs:   Ht 165.1 cm (65\")   Wt 83.5 kg (184 lb)   BMI 30.62 kg/m²       Physical Exam  Constitutional:       Appearance: Normal appearance. The patient is well-developed and normal weight.   HENT:      Head: Normocephalic.      Right Ear: Hearing and external ear normal.      Left Ear: Hearing and external ear normal.      Nose: Nose normal.   Eyes:      Conjunctiva/sclera: Conjunctivae normal.   Cardiovascular:      Rate and Rhythm: Normal rate.   Pulmonary:      Effort: Pulmonary effort is normal.      Breath sounds: No wheezing or rales.   Abdominal:      Palpations: Abdomen is soft.      Tenderness: There is no abdominal tenderness.   Musculoskeletal:      Cervical back: Normal range of motion.   Skin:     Findings: No rash.   Neurological:      Mental Status: The patient is alert and oriented to person, place, and time.   Psychiatric:         Mood and Affect: Mood and affect normal.         Judgment: Judgment normal.       Ortho Exam      Left knee- extension -5.  small effusion. Tender to the medial and lateral joint line. Stable to varus/valgus stress. Stable to anterior/posterior drawer. Positive EHL, FHL, GS and TA. Sensation intact to all 5 nerves of the foot. Positive " pulses. Neurovascularly intact. Pain with Sherwin's. Negative Lachman's. Flexion 125 degrees.     Procedures      Imaging Results (Most Recent)     None           Result Review :         Assessment and Plan     Diagnoses and all orders for this visit:    1. Baker's cyst of knee, left (Primary)    2. Primary osteoarthritis of left knee    3. Effusion of left knee        Discussed the treatment plan with the patient.  I reviewed the images/test that were obtained previously with the patient. Plan for MRI of the left knee to evaluate the meniscus.     Call or return if worsening symptoms.    Follow Up     MRI results.       Patient was given instructions and counseling regarding her condition or for health maintenance advice. Please see specific information pulled into the AVS if appropriate.     Scribed for Philip Mckeon MD by Moriah Moffett.  01/19/23   14:46 EST    I have personally performed the services described in this document as scribed by the above individual and it is both accurate and complete. Philip Mckeon MD 01/19/23

## 2023-01-27 ENCOUNTER — TELEPHONE (OUTPATIENT)
Dept: ORTHOPEDIC SURGERY | Facility: CLINIC | Age: 58
End: 2023-01-27
Payer: OTHER GOVERNMENT

## 2023-01-27 DIAGNOSIS — M17.12 PRIMARY OSTEOARTHRITIS OF LEFT KNEE: Primary | ICD-10-CM

## 2023-02-02 RX ORDER — DIAZEPAM 10 MG/1
TABLET ORAL
Qty: 1 TABLET | Refills: 0 | Status: SHIPPED | OUTPATIENT
Start: 2023-02-02

## 2023-02-02 NOTE — TELEPHONE ENCOUNTER
Called and spoke with patient and informed her that her prescription is ready for . She will come by the office and grab it.

## 2023-02-17 ENCOUNTER — HOSPITAL ENCOUNTER (OUTPATIENT)
Dept: MRI IMAGING | Facility: HOSPITAL | Age: 58
Discharge: HOME | End: 2023-02-17
Admitting: ORTHOPAEDIC SURGERY
Payer: OTHER GOVERNMENT

## 2023-02-17 DIAGNOSIS — M25.462 EFFUSION OF LEFT KNEE: ICD-10-CM

## 2023-02-17 DIAGNOSIS — M17.12 PRIMARY OSTEOARTHRITIS OF LEFT KNEE: ICD-10-CM

## 2023-02-17 PROCEDURE — 73721 MRI JNT OF LWR EXTRE W/O DYE: CPT

## 2023-02-23 ENCOUNTER — OFFICE VISIT (OUTPATIENT)
Dept: ORTHOPEDIC SURGERY | Facility: CLINIC | Age: 58
End: 2023-02-23
Payer: OTHER GOVERNMENT

## 2023-02-23 VITALS — HEART RATE: 83 BPM | BODY MASS INDEX: 30.82 KG/M2 | WEIGHT: 185 LBS | HEIGHT: 65 IN | OXYGEN SATURATION: 98 %

## 2023-02-23 DIAGNOSIS — M25.462 EFFUSION OF LEFT KNEE: ICD-10-CM

## 2023-02-23 DIAGNOSIS — M17.12 PRIMARY OSTEOARTHRITIS OF LEFT KNEE: ICD-10-CM

## 2023-02-23 DIAGNOSIS — S83.282A TEAR OF LATERAL MENISCUS OF LEFT KNEE, CURRENT, UNSPECIFIED TEAR TYPE, INITIAL ENCOUNTER: Primary | ICD-10-CM

## 2023-02-23 DIAGNOSIS — M71.22 BAKER'S CYST OF KNEE, LEFT: ICD-10-CM

## 2023-02-23 PROCEDURE — 99213 OFFICE O/P EST LOW 20 MIN: CPT | Performed by: ORTHOPAEDIC SURGERY

## 2023-02-23 PROCEDURE — 20610 DRAIN/INJ JOINT/BURSA W/O US: CPT | Performed by: ORTHOPAEDIC SURGERY

## 2023-02-23 RX ORDER — LIDOCAINE HYDROCHLORIDE 10 MG/ML
5 INJECTION, SOLUTION EPIDURAL; INFILTRATION; INTRACAUDAL; PERINEURAL
Status: COMPLETED | OUTPATIENT
Start: 2023-02-23 | End: 2023-02-23

## 2023-02-23 RX ORDER — TRIAMCINOLONE ACETONIDE 40 MG/ML
40 INJECTION, SUSPENSION INTRA-ARTICULAR; INTRAMUSCULAR
Status: COMPLETED | OUTPATIENT
Start: 2023-02-23 | End: 2023-02-23

## 2023-02-23 RX ADMIN — TRIAMCINOLONE ACETONIDE 40 MG: 40 INJECTION, SUSPENSION INTRA-ARTICULAR; INTRAMUSCULAR at 11:20

## 2023-02-23 RX ADMIN — LIDOCAINE HYDROCHLORIDE 5 ML: 10 INJECTION, SOLUTION EPIDURAL; INFILTRATION; INTRACAUDAL; PERINEURAL at 11:20

## 2023-02-23 NOTE — PROGRESS NOTES
"Chief Complaint  Follow-up of the Left Knee     Subjective      Sindi Dang presents to [facility] for follow up evaluation of the left knee. The patient recently had an MRI and is here today for those results. To review, The patient reports left knee pain. She was treated for breast cancer. She reports she had swelling in the knee that she developed after an osteoporosis infusion in September. She denies injury or trauma. She reports some swelling has improved.     No Known Allergies     Social History     Socioeconomic History   • Marital status:    • Number of children: 1   Tobacco Use   • Smoking status: Never   • Smokeless tobacco: Never   Substance and Sexual Activity   • Alcohol use: Never   • Sexual activity: Defer        Review of Systems     Objective   Vital Signs:   Pulse 83   Ht 165.1 cm (65\")   Wt 83.9 kg (185 lb)   SpO2 98%   BMI 30.79 kg/m²       Physical Exam  Constitutional:       Appearance: Normal appearance. The patient is well-developed and normal weight.   HENT:      Head: Normocephalic.      Right Ear: Hearing and external ear normal.      Left Ear: Hearing and external ear normal.      Nose: Nose normal.   Eyes:      Conjunctiva/sclera: Conjunctivae normal.   Cardiovascular:      Rate and Rhythm: Normal rate.   Pulmonary:      Effort: Pulmonary effort is normal.      Breath sounds: No wheezing or rales.   Abdominal:      Palpations: Abdomen is soft.      Tenderness: There is no abdominal tenderness.   Musculoskeletal:      Cervical back: Normal range of motion.   Skin:     Findings: No rash.   Neurological:      Mental Status: The patient is alert and oriented to person, place, and time.   Psychiatric:         Mood and Affect: Mood and affect normal.         Judgment: Judgment normal.       Ortho Exam      Left knee- extension -5.  small effusion. Tender to the medial and lateral joint line. Stable to varus/valgus stress. Stable to " anterior/posterior drawer. Positive EHL, FHL, GS and TA. Sensation intact to all 5 nerves of the foot. Positive pulses. Neurovascularly intact. Pain with Sherwin's. Negative Lachman's. Flexion 125 degrees.     Large Joint Arthrocentesis: L knee  Date/Time: 2/23/2023 11:20 AM  Consent given by: patient  Site marked: site marked  Timeout: Immediately prior to procedure a time out was called to verify the correct patient, procedure, equipment, support staff and site/side marked as required   Supporting Documentation  Indications: pain   Procedure Details  Location: knee - L knee  Needle gauge: 21G.  Medications administered: 5 mL lidocaine PF 1% 1 %; 40 mg triamcinolone acetonide 40 MG/ML  Patient tolerance: patient tolerated the procedure well with no immediate complications            Imaging Results (Most Recent)     None           Result Review :       MRI Knee Left Without Contrast    Result Date: 2/17/2023  Narrative: PROCEDURE: MRI KNEE LEFT  WO CONTRAST  COMPARISON: None  INDICATIONS: Left knee pain and swelling nki      TECHNIQUE: A complete multi-planar MRI was performed.   FINDINGS:  There is a small to moderate knee effusion.  There is some irregularity of the synovium in the suprapatellar recess which may indicate synovitis.  There is a moderate Baker's cyst with a small amount of fluid along the medial gastrocnemius which may represent rupture/leakage.  There is mild osteophyte formation.  There is a trace amount of subchondral edema at the medial and lateral femoral condyles.  No evidence of fracture.  The cruciate and collateral ligaments appear intact.  The popliteus tendon and iliotibial band appear intact.  Quadriceps and patellar tendons appear intact.  The medial meniscus appears intact.  There is complex tear of the anterior horn and body of the lateral meniscus.  There are horizontal and radial free edge tears of the body.  There also appears to be longitudinal and radial tear of the anterior  horn.  There appears to be mild extrusion of the lateral meniscus body.  There is tendinopathy and suspected ganglion cyst formation at the origin of the gastrocnemius.  There is patellofemoral chondromalacia with suspected partial thickness cartilage loss and partial-thickness cartilage defects.  There is diffuse partial thickness cartilage thinning with small high-grade cartilage defects of the weight-bearing medial and lateral tibiofemoral compartments.      Impression:   1. Complex tear of the anterior horn and body of the lateral meniscus with mild extrusion of the lateral meniscus body. 2. Tricompartmental osteoarthritis with suspected partial thickness cartilage loss and probable small high-grade cartilage defects of the weight-bearing medial and lateral tibiofemoral compartments. 3. Small to moderate knee effusion with possible synovitis. 4. Moderate Baker's cyst with suspected rupture/leakage.     VERNA SOOD MD       Electronically Signed and Approved By: VERNA SOOD MD on 2/17/2023 at 14:02                      Assessment and Plan     Diagnoses and all orders for this visit:    1. Tear of lateral meniscus of left knee, current, unspecified tear type, initial encounter (Primary)    2. Primary osteoarthritis of left knee    3. Baker's cyst of knee, left    4. Effusion of left knee        Discussed the treatment plan with the patient.  I reviewed the MRI with the patient. Plan for conservative treatment at this time. Discussed the risks and benefits of a left knee steroid injection. The patient expressed understanding and wished to proceed. She tolerated the injection well. Plan to seek approval for a left knee Synvisc injection. Order for physical therapy given today.     Call or return if worsening symptoms.    Follow Up     6 weeks      Patient was given instructions and counseling regarding her condition or for health maintenance advice. Please see specific information pulled into the AVS if  appropriate.     Scribed for Philip Mckeon MD by Moriah Moffett.  02/23/23   11:07 EST    I have personally performed the services described in this document as scribed by the above individual and it is both accurate and complete. Philip Mckeon MD 02/23/23

## 2023-03-02 ENCOUNTER — TELEPHONE (OUTPATIENT)
Dept: ORTHOPEDIC SURGERY | Facility: CLINIC | Age: 58
End: 2023-03-02
Payer: OTHER GOVERNMENT

## 2023-03-02 ENCOUNTER — TREATMENT (OUTPATIENT)
Dept: PHYSICAL THERAPY | Facility: CLINIC | Age: 58
End: 2023-03-02
Payer: OTHER GOVERNMENT

## 2023-03-02 DIAGNOSIS — M25.562 LEFT KNEE PAIN, UNSPECIFIED CHRONICITY: ICD-10-CM

## 2023-03-02 DIAGNOSIS — R26.2 DIFFICULTY WALKING: ICD-10-CM

## 2023-03-02 DIAGNOSIS — M23.201 OLD TEAR OF LATERAL MENISCUS OF LEFT KNEE, UNSPECIFIED TEAR TYPE: Primary | ICD-10-CM

## 2023-03-02 PROCEDURE — 97110 THERAPEUTIC EXERCISES: CPT | Performed by: PHYSICAL THERAPIST

## 2023-03-02 PROCEDURE — 97161 PT EVAL LOW COMPLEX 20 MIN: CPT | Performed by: PHYSICAL THERAPIST

## 2023-03-02 NOTE — TELEPHONE ENCOUNTER
[facility] received a fax that requires your attention. The document has been indexed to the patient’s chart for your review.      Reason for sending: RECEIVED AUTHORIZATION FOR SYNVISC    Documents Description: [facility] -SYNVISC-3/2/2023    Name of Sender: [employer]    Date Indexed: 3/2/2023

## 2023-03-02 NOTE — PROGRESS NOTES
Physical Therapy Initial Evaluation and Plan of Care                    [facility] [address] [place] [address]    Patient: Sindi Dang   : 1965  Diagnosis/ICD-10 Code:  Old tear of lateral meniscus of left knee, unspecified tear type [M23.201]  Referring practitioner: Philip Mckeon MD  Date of Initial Visit: 3/2/2023  Today's Date: 3/2/2023  Patient seen for 1 sessions           Subjective Questionnaire: LEFS:  = 15% Function      Subjective Evaluation    History of Present Illness  Mechanism of injury: The patient presents to physical therapy with complaints of left knee pain that has been present since receiving an infusion of Reclast for her cancer treatment in 2020. She had a history of breast cancer from . She had previously been treated with Zometa infusions. After the issue with the Reclast, she switched back to the Zometa infusions. She received a second Reclast infusion in 2022 and her pain was again worsened. She feels like the side effects of the Reclast is the cause of her left knee pain. She doesn't have any other mechanism of injury.     Her knee pain has been improved significantly since receiving an injection at her orthopedic visit. Her pain today is located around the periphery of her left patella. Her pain is increased with bending her knee and with going up/down stairs. Her pain is improved with rest and Tylenol PRN. She has occasional numbness/tingling into her left leg.    She had a recent MRI which revealed a complex tear of the lateral meniscus of the left knee, tricompartmental OA, moderate knee effusion, and a moderate Baker's cyst with suspected rupture/leakage.      Patient Occupation: Not working Pain  Current pain ratin  At worst pain ratin    Patient Goals  Patient goal: The patient would like to have less pain, return to recreational walking, and be able to perform normal ADLs without limitation.            Objective          Tenderness   Left Knee   Tenderness in the lateral joint line, lateral patella, lateral retinaculum, medial patella and patellar tendon.     Neurological Testing     Additional Neurological Details  Sensation to light touch intact and equal bilaterally from L2-S1 dermatomal level.    Active Range of Motion   Left Knee   Flexion: 121 degrees with pain  Extension: 0 degrees     Right Knee   Flexion: 140 degrees   Extension: 0 degrees     Patellar Mobility   Left Knee Patellar tendons within functional limits include the superior and inferior. Hypermobile in the left medial and left lateral patellar tendon(s).     Right Knee Patellar tendons within functional limits include the medial, lateral, superior and inferior.     Strength/Myotome Testing     Left Hip   Planes of Motion   Flexion: 3  Extension: 4-  Abduction: 4-    Right Hip   Planes of Motion   Flexion: 4+  Extension: 4  Abduction: 4    Left Knee   Flexion: 4  Extension: 3+  Quadriceps contraction: fair    Right Knee   Flexion: 5  Extension: 5  Quadriceps contraction: good    Left Ankle/Foot   Dorsiflexion: 5    Right Ankle/Foot   Dorsiflexion: 5    Tests     Left Knee   Negative anterior Lachman, posterior Lachman, valgus stress test at 0 degrees, valgus stress test at 30 degrees, varus stress test at 0 degrees and varus stress test at 30 degrees.     Ambulation     Ambulation: Level Surfaces     Additional Level Surfaces Ambulation Details  The patient ambulates with slightly decreased stance time on the left lower extremity.    Functional Assessment     Single Leg Stance   Left: 5 seconds      See Exercise, Manual, and Modality Logs for complete treatment.     Assessment & Plan     Assessment  Impairments: abnormal gait, abnormal muscle firing, abnormal or restricted ROM, activity intolerance, impaired physical strength, lacks appropriate home exercise program, pain with function, safety issue and weight-bearing  intolerance  Functional Limitations: walking, uncomfortable because of pain, moving in bed, standing and stooping  Assessment details: The patient presents to physical therapy with complaints of left knee pain with a recent MRI which revealed a lateral meniscus tear, tricompartmental arthritis, and a Baker's cyst. She presents with associated left knee stiffness, left knee weakness, left hip weakness, tenderness to palpation, an antalgic gait, and functional deficits (LEFS). She would benefit from skilled physical therapy as described below to address these limitations and allow the patient to return to her prior level of function.     Prognosis: good    Goals  Plan Goals: KNEE PROBLEMS:     1. The patient has limited ROM of the left knee.   LTG 1: 12 weeks:  The patient will demonstrate 0 to 135 degrees of ROM for the left knee in order to allow patient to ambulate and perform mobility related ADLs.    STATUS:  New   STG 1a: 6 weeks:  The patient will demonstrate 0 to 125 degrees of ROM for the left knee.    STATUS:  New    2. The patient has limited strength of the left knee.   LTG 2: 12 weeks: The patient will demonstrate 4+ /5 strength for left knee flexion and extension in order to allow patient improved joint stability    STATUS:  New   STG 2a: 6 weeks: The patient will demonstrate 4 /5 strength for left knee flexion and extension    STATUS:  New      3. The patient has gait dysfunction.   LTG 3: 12 weeks:  The patient will ambulate without assistive device, independently, for community distances with normal biomechanics in order to improve mobility and allow patient to perform activities such as grocery shopping with greater ease.    STATUS:  New    4. Mobility: Walking/Moving Around Functional Limitation     LTG 4: 12 weeks:  The patient will demonstrate 25 % limitation by achieving a score of 60/80 on the LEFS.    STATUS:  New   STG 5 a: 6 weeks:  The patient will demonstrate 50 % limitation by achieving a  score of 40/80 on the LEFS.      STATUS:  New    5. The patient has limited strength of the left hip.   LTG 5: 12 weeks: The patient will demonstrate 4+ /5 strength for left hip flexion, abduction, and extension in order to allow patient improved joint stability    STATUS:  New   STG 5a: 6 weeks: The patient will demonstrate 4 /5 strength for left hip flexion, abduction, and extension    STATUS:  New     Plan  Therapy options: will be seen for skilled therapy services  Planned modality interventions: cryotherapy, electrical stimulation/Russian stimulation and TENS  Planned therapy interventions: balance/weight-bearing training, ADL retraining, soft tissue mobilization, strengthening, stretching, therapeutic activities, joint mobilization, home exercise program, gait training, functional ROM exercises, flexibility, body mechanics training, postural training, neuromuscular re-education and manual therapy  Frequency: 3x week  Duration in weeks: 12  Treatment plan discussed with: patient        Visit Diagnoses:    ICD-10-CM ICD-9-CM   1. Old tear of lateral meniscus of left knee, unspecified tear type  M23.201 717.40   2. Left knee pain, unspecified chronicity  M25.562 719.46   3. Difficulty walking  R26.2 719.7       History # of Personal Factors and/or Comorbidities: MODERATE (1-2)  Examination of Body System(s): # of elements: LOW (1-2)  Clinical Presentation: STABLE   Clinical Decision Making: LOW       Timed:         Manual Therapy:    0     mins  11058;     Therapeutic Exercise:    10     mins  93623;     Neuromuscular Jason:    0    mins  77984;    Therapeutic Activity:     0     mins  91141;     Gait Trainin     mins  39293;     Ultrasound:     0     mins  49894;    Ionto                               0    mins   82702  Self Care                       0     mins   77782  Canalith Repos    0     mins 72281      Un-Timed:  Electrical Stimulation:    0     mins  88328 ( );  Dry Needling     0      mins self-pay  Traction     0     mins 57843  Low Eval     35     Mins  56299  Mod Eval     0     Mins  91310  High Eval                       0     Mins  04589  Re-Eval                           0    mins  64232    Timed Treatment:   10   mins   Total Treatment:     45   mins    PT SIGNATURE: Carlos Caldwell PT    Electronically signed 3/2/2023    [place] License: PT - [id number]      Initial Certification  Certification Period: 3/2/2023 thru 5/30/2023  I certify that the therapy services are furnished while this patient is under my care.  The services outlined above are required by this patient, and will be reviewed every 90 days.     PHYSICIAN: Philip Mckeon MD  NPI: [id number]      DATE:     Please sign and return via fax to [phone]. Thank you, [facility].

## 2023-03-07 ENCOUNTER — TREATMENT (OUTPATIENT)
Dept: PHYSICAL THERAPY | Facility: CLINIC | Age: 58
End: 2023-03-07
Payer: OTHER GOVERNMENT

## 2023-03-07 DIAGNOSIS — M25.562 LEFT KNEE PAIN, UNSPECIFIED CHRONICITY: ICD-10-CM

## 2023-03-07 DIAGNOSIS — R26.2 DIFFICULTY WALKING: ICD-10-CM

## 2023-03-07 DIAGNOSIS — M23.201 OLD TEAR OF LATERAL MENISCUS OF LEFT KNEE, UNSPECIFIED TEAR TYPE: Primary | ICD-10-CM

## 2023-03-07 PROCEDURE — 97530 THERAPEUTIC ACTIVITIES: CPT | Performed by: PHYSICAL THERAPIST

## 2023-03-07 PROCEDURE — 97110 THERAPEUTIC EXERCISES: CPT | Performed by: PHYSICAL THERAPIST

## 2023-03-07 NOTE — PROGRESS NOTES
Physical Therapy Daily Treatment Note      Patient: Sindi Dang   : 1965  Referring practitioner: Philip Mckeon MD  Date of Initial Visit: Type: THERAPY  Noted: 3/2/2023  Today's Date: 3/7/2023  Patient seen for 2 sessions           Subjective Questionnaire:       Subjective Evaluation    History of Present Illness    Subjective comment: Pt reported 2-3/10 at rest.  Pt reported pain with cycling on recumbent bike and did 2 minutes.         Objective   See Exercise, Manual, and Modality Logs for complete treatment.       Assessment & Plan     Assessment    Assessment details: Pt reported pain with all therapeutic exercise and exercises were performed seated and standing vs Cybex equipment. Continue with POC as tolerated.        Visit Diagnoses:    ICD-10-CM ICD-9-CM   1. Old tear of lateral meniscus of left knee, unspecified tear type  M23.201 717.40   2. Left knee pain, unspecified chronicity  M25.562 719.46   3. Difficulty walking  R26.2 719.7       Progress per Plan of Care and Progress strengthening /stabilization /functional activity           Timed:  Manual Therapy:         mins  47434;  Therapeutic Exercise:    20     mins  06841;     Neuromuscular Jason:        mins  12810;    Therapeutic Activity:     8     mins  82447;     Gait Training:           mins  36390;     Ultrasound:          mins  76063;    Electrical Stimulation:         mins  97711 ( );  Aquatic Therapy          mins  14760    Untimed:  Electrical Stimulation:         mins  29814 ( );  Mechanical Traction:         mins  66218;     Timed Treatment:   28   mins   Total Treatment:     28   mins    Electronically signed    Delia Calix PTA  Physical Therapist Assistant    [place] license: [id number]

## 2023-03-10 ENCOUNTER — TREATMENT (OUTPATIENT)
Dept: PHYSICAL THERAPY | Facility: CLINIC | Age: 58
End: 2023-03-10
Payer: OTHER GOVERNMENT

## 2023-03-10 DIAGNOSIS — M23.201 OLD TEAR OF LATERAL MENISCUS OF LEFT KNEE, UNSPECIFIED TEAR TYPE: Primary | ICD-10-CM

## 2023-03-10 DIAGNOSIS — M25.562 LEFT KNEE PAIN, UNSPECIFIED CHRONICITY: ICD-10-CM

## 2023-03-10 DIAGNOSIS — R26.2 DIFFICULTY WALKING: ICD-10-CM

## 2023-03-10 PROCEDURE — 97530 THERAPEUTIC ACTIVITIES: CPT | Performed by: PHYSICAL THERAPIST

## 2023-03-10 PROCEDURE — 97110 THERAPEUTIC EXERCISES: CPT | Performed by: PHYSICAL THERAPIST

## 2023-03-10 NOTE — PROGRESS NOTES
"Outpatient Physical Therapy  [address] [facility], [place] [address]                 Physical Therapy Daily Treatment Note    Patient: Sindi Dang   : 1965  Diagnosis/ICD-10 Code:  Old tear of lateral meniscus of left knee, unspecified tear type [M23.201]  Referring practitioner: Philip Mckeon MD  Date of Initial Visit: Type: THERAPY  Noted: 3/2/2023  Today's Date: 3/10/2023  Patient seen for 3 sessions             Subjective   Sindi Dang reports: \"it feels like its going to explode\" when bending the knee on the bike. Patient reports her left knee is very tender, so much so that she is not able to cross her right leg over her left. Patient commented without the injection she doesn't think she would be able to do any of these exercises today.     Pain:   2/10 pain, in left knee at time of arrival   3/10 pain after bike     Objective     See Exercise, Manual, and Modality Logs for complete treatment.     Assessment/Plan  Sindi is just beginning care to attend to deficits outlined in evaluation, requiring further therapist directed strengthening. Patient had increased difficulty with side steps to the left. Plan to assess how patient tolerated treatment next session.     Progress per Plan of Care     Timed:  Manual Therapy:    0     mins  94394;  Therapeutic Exercise:    15     mins  57453;     Neuromuscular Jason:    0    mins  15237;    Therapeutic Activity:     23     mins  36417;     Gait Trainin     mins  94332;    Aquatic Therapy:     0     mins  80491;       Untimed:  Electrical Stimulation:    0     mins  57960 ( );  Mechanical Traction:    0     mins  97128;       Timed Treatment:   38   mins   Total Treatment:     38   mins      Electronically signed:   Tayla Wilson PTA  Physical Therapist Assistant  [facility] License #: [id number]  "

## 2023-03-14 ENCOUNTER — TREATMENT (OUTPATIENT)
Dept: PHYSICAL THERAPY | Facility: CLINIC | Age: 58
End: 2023-03-14
Payer: OTHER GOVERNMENT

## 2023-03-14 DIAGNOSIS — R26.2 DIFFICULTY WALKING: ICD-10-CM

## 2023-03-14 DIAGNOSIS — M25.562 LEFT KNEE PAIN, UNSPECIFIED CHRONICITY: ICD-10-CM

## 2023-03-14 DIAGNOSIS — M23.201 OLD TEAR OF LATERAL MENISCUS OF LEFT KNEE, UNSPECIFIED TEAR TYPE: Primary | ICD-10-CM

## 2023-03-14 PROCEDURE — 97530 THERAPEUTIC ACTIVITIES: CPT | Performed by: PHYSICAL THERAPIST

## 2023-03-14 PROCEDURE — 97110 THERAPEUTIC EXERCISES: CPT | Performed by: PHYSICAL THERAPIST

## 2023-03-14 NOTE — PROGRESS NOTES
Outpatient Physical Therapy  [address] [facility], [place] [address]                 Physical Therapy Daily Treatment Note    Patient: Sindi Dang   : 1965  Diagnosis/ICD-10 Code:  Old tear of lateral meniscus of left knee, unspecified tear type [M23.201]  Referring practitioner: Philip Mckeon MD  Date of Initial Visit: Type: THERAPY  Noted: 3/2/2023  Today's Date: 3/14/2023  Patient seen for 4 sessions             Subjective   Sindi Dang reports: she was sore for a couple days following last session. Patient reports she has noticed more improvements since last visit and is now able to bend her knee further.    Objective     See Exercise, Manual, and Modality Logs for complete treatment.     Assessment/Plan  Sindi progressing as evident by decreased overall left knee pain. Pt tolerated exercises well, no complaints of increased pain or discomfort. Pt would benefit from skilled PT to address Range of Motion and Strength deficits, pain management and any concerns with ADLs.     Progress per Plan of Care       Timed:  Manual Therapy:    0     mins  98660;  Therapeutic Exercise:    15     mins  42355;     Neuromuscular Jason:    0    mins  17595;    Therapeutic Activity:     10     mins  13070;     Gait Trainin     mins  36281;    Aquatic Therapy:     0     mins  39243;       Untimed:  Electrical Stimulation:    0     mins  56817 ( );  Mechanical Traction:    0     mins  43782;       Timed Treatment:   25   mins   Total Treatment:     25   mins      Electronically signed:   Tayla Wilson PTA  Physical Therapist Assistant  [facility] License #: [id number]

## 2023-03-16 ENCOUNTER — TREATMENT (OUTPATIENT)
Dept: PHYSICAL THERAPY | Facility: CLINIC | Age: 58
End: 2023-03-16
Payer: OTHER GOVERNMENT

## 2023-03-16 DIAGNOSIS — M25.562 LEFT KNEE PAIN, UNSPECIFIED CHRONICITY: ICD-10-CM

## 2023-03-16 DIAGNOSIS — R26.2 DIFFICULTY WALKING: ICD-10-CM

## 2023-03-16 DIAGNOSIS — M23.201 OLD TEAR OF LATERAL MENISCUS OF LEFT KNEE, UNSPECIFIED TEAR TYPE: Primary | ICD-10-CM

## 2023-03-16 PROCEDURE — 97110 THERAPEUTIC EXERCISES: CPT | Performed by: PHYSICAL THERAPIST

## 2023-03-16 PROCEDURE — 97530 THERAPEUTIC ACTIVITIES: CPT | Performed by: PHYSICAL THERAPIST

## 2023-03-16 NOTE — PROGRESS NOTES
"Physical Therapy Daily Treatment Note      Patient: Sindi Dang   : 1965  Referring practitioner: Philip Mckeon MD  Date of Initial Visit: Type: THERAPY  Noted: 3/2/2023  Today's Date: 3/16/2023  Patient seen for 5 sessions           Subjective Questionnaire:       Subjective Evaluation    History of Present Illness    Subjective comment: Pt reports L knee pain \"a little bit.\"  Pt reports her pain moves around on knee on movement.       Objective   See Exercise, Manual, and Modality Logs for complete treatment.       Assessment & Plan     Assessment    Assessment details: Pt was able to cycle on recumbent bike a more freely without increased pain.  Pt reported lateral pain with pressure on R knee with Cybex hip ABD and weak with TKE.  Pt will benefit from continued PT.        Visit Diagnoses:    ICD-10-CM ICD-9-CM   1. Old tear of lateral meniscus of left knee, unspecified tear type  M23.201 717.40   2. Left knee pain, unspecified chronicity  M25.562 719.46   3. Difficulty walking  R26.2 719.7       Progress per Plan of Care and Progress strengthening /stabilization /functional activity           Timed:  Manual Therapy:         mins  73828;  Therapeutic Exercise:   22     mins  78929;     Neuromuscular Jason:        mins  94285;    Therapeutic Activity:     8     mins  50326;     Gait Training:           mins  14966;     Ultrasound:          mins  61349;    Electrical Stimulation:         mins  58120 ( );  Aquatic Therapy          mins  04432    Untimed:  Electrical Stimulation:         mins  55195 ( );  Mechanical Traction:         mins  60203;     Timed Treatment:   30   mins   Total Treatment:     30   mins    Electronically signed    Delia Calix PTA  Physical Therapist Assistant    [place] license: [id number]    "

## 2023-03-20 ENCOUNTER — TREATMENT (OUTPATIENT)
Dept: PHYSICAL THERAPY | Facility: CLINIC | Age: 58
End: 2023-03-20
Payer: OTHER GOVERNMENT

## 2023-03-20 DIAGNOSIS — M25.562 LEFT KNEE PAIN, UNSPECIFIED CHRONICITY: ICD-10-CM

## 2023-03-20 DIAGNOSIS — R26.2 DIFFICULTY WALKING: ICD-10-CM

## 2023-03-20 DIAGNOSIS — M23.201 OLD TEAR OF LATERAL MENISCUS OF LEFT KNEE, UNSPECIFIED TEAR TYPE: Primary | ICD-10-CM

## 2023-03-20 PROCEDURE — 97110 THERAPEUTIC EXERCISES: CPT | Performed by: PHYSICAL THERAPIST

## 2023-03-20 PROCEDURE — 97530 THERAPEUTIC ACTIVITIES: CPT | Performed by: PHYSICAL THERAPIST

## 2023-03-23 ENCOUNTER — TREATMENT (OUTPATIENT)
Dept: PHYSICAL THERAPY | Facility: CLINIC | Age: 58
End: 2023-03-23
Payer: OTHER GOVERNMENT

## 2023-03-23 DIAGNOSIS — M23.201 OLD TEAR OF LATERAL MENISCUS OF LEFT KNEE, UNSPECIFIED TEAR TYPE: Primary | ICD-10-CM

## 2023-03-23 DIAGNOSIS — R26.2 DIFFICULTY WALKING: ICD-10-CM

## 2023-03-23 DIAGNOSIS — M25.562 LEFT KNEE PAIN, UNSPECIFIED CHRONICITY: ICD-10-CM

## 2023-03-23 PROCEDURE — 97530 THERAPEUTIC ACTIVITIES: CPT | Performed by: PHYSICAL THERAPIST

## 2023-03-23 PROCEDURE — 97110 THERAPEUTIC EXERCISES: CPT | Performed by: PHYSICAL THERAPIST

## 2023-03-23 NOTE — PROGRESS NOTES
Physical Therapy Daily Treatment Note      Patient: Sindi Dang   : 1965  Referring practitioner: Philip Mckeon MD  Date of Initial Visit: Type: THERAPY  Noted: 3/2/2023  Today's Date: 3/23/2023  Patient seen for 7 sessions           Subjective Questionnaire:       Subjective Evaluation    History of Present Illness    Subjective comment: Pt reported minor pain in L knee.  Pt reports she is walking normal for the most part with intermittent pain that reminds her it still hurts.       Objective   See Exercise, Manual, and Modality Logs for complete treatment.       Assessment & Plan     Assessment    Assessment details: Pt tolerated cycling on recumbent bike easier today.  Pt reports she is a lot better and is walking normal.  Pt tolerated strengthening well.  Pt reported just a light pulse at lateral L knee after session.  Continue with POC.        Visit Diagnoses:    ICD-10-CM ICD-9-CM   1. Old tear of lateral meniscus of left knee, unspecified tear type  M23.201 717.40   2. Left knee pain, unspecified chronicity  M25.562 719.46   3. Difficulty walking  R26.2 719.7       Progress per Plan of Care and Progress strengthening /stabilization /functional activity           Timed:  Manual Therapy:         mins  06896;  Therapeutic Exercise:    23     mins  06757;     Neuromuscular Jason:       mins  20473;    Therapeutic Activity:     15     mins  09926;     Gait Training:           mins  19981;     Ultrasound:          mins  37712;    Electrical Stimulation:         mins  76098 ( );  Aquatic Therapy          mins  55650    Untimed:  Electrical Stimulation:         mins  62591 ( );  Mechanical Traction:         mins  85517;     Timed Treatment:   38   mins   Total Treatment:     38   mins    Electronically signed    Delia Calix PTA  Physical Therapist Assistant    [place] license: [id number]

## 2023-03-27 ENCOUNTER — TREATMENT (OUTPATIENT)
Dept: PHYSICAL THERAPY | Facility: CLINIC | Age: 58
End: 2023-03-27
Payer: OTHER GOVERNMENT

## 2023-03-27 DIAGNOSIS — M23.201 OLD TEAR OF LATERAL MENISCUS OF LEFT KNEE, UNSPECIFIED TEAR TYPE: Primary | ICD-10-CM

## 2023-03-27 DIAGNOSIS — M25.562 LEFT KNEE PAIN, UNSPECIFIED CHRONICITY: ICD-10-CM

## 2023-03-27 DIAGNOSIS — R26.2 DIFFICULTY WALKING: ICD-10-CM

## 2023-03-27 PROCEDURE — 97110 THERAPEUTIC EXERCISES: CPT | Performed by: PHYSICAL THERAPIST

## 2023-03-27 PROCEDURE — 97530 THERAPEUTIC ACTIVITIES: CPT | Performed by: PHYSICAL THERAPIST

## 2023-03-27 NOTE — PROGRESS NOTES
Physical Therapy Daily Treatment Note      Patient: Sindi Dang   : 1965  Referring practitioner: Philip Mckeon MD  Date of Initial Visit: Type: THERAPY  Noted: 3/2/2023  Today's Date: 3/27/2023  Patient seen for 8 sessions           Subjective Questionnaire:       Subjective Evaluation    History of Present Illness    Subjective comment: Pt reported minimal L knee pain today.  Pt reports Pt reports she is careful with all activity secondary to cancer medication she takes causes osteoporosis and she is afraid of breaking a bone.         Objective   See Exercise, Manual, and Modality Logs for complete treatment.       Assessment & Plan     Assessment    Assessment details: Pt tolerates gentle strengthening well.  Pt had made progress reporting decreased pain, improved ability to cycle on recumbent bank and improved ability to move.        Visit Diagnoses:    ICD-10-CM ICD-9-CM   1. Old tear of lateral meniscus of left knee, unspecified tear type  M23.201 717.40   2. Left knee pain, unspecified chronicity  M25.562 719.46   3. Difficulty walking  R26.2 719.7       Progress per Plan of Care and Progress strengthening /stabilization /functional activity           Timed:  Manual Therapy:         mins  91275;  Therapeutic Exercise:    32     mins  17114;     Neuromuscular Jason:        mins  41363;    Therapeutic Activity:     10     mins  86224;     Gait Training:           mins  50018;     Ultrasound:          mins  91173;    Electrical Stimulation:         mins  24716 ( );  Aquatic Therapy          mins  48130    Untimed:  Electrical Stimulation:         mins  89075 ( );  Mechanical Traction:         mins  95130;     Timed Treatment:   42   mins   Total Treatment:     42   mins    Electronically signed    Delia Calix PTA  Physical Therapist Assistant    [facility] license: [id number]

## 2023-03-29 ENCOUNTER — TREATMENT (OUTPATIENT)
Dept: PHYSICAL THERAPY | Facility: CLINIC | Age: 58
End: 2023-03-29
Payer: OTHER GOVERNMENT

## 2023-03-29 DIAGNOSIS — M25.562 LEFT KNEE PAIN, UNSPECIFIED CHRONICITY: ICD-10-CM

## 2023-03-29 DIAGNOSIS — R26.2 DIFFICULTY WALKING: ICD-10-CM

## 2023-03-29 DIAGNOSIS — M23.201 OLD TEAR OF LATERAL MENISCUS OF LEFT KNEE, UNSPECIFIED TEAR TYPE: Primary | ICD-10-CM

## 2023-03-29 PROCEDURE — 97110 THERAPEUTIC EXERCISES: CPT | Performed by: PHYSICAL THERAPIST

## 2023-03-29 PROCEDURE — 97530 THERAPEUTIC ACTIVITIES: CPT | Performed by: PHYSICAL THERAPIST

## 2023-03-29 NOTE — PROGRESS NOTES
Progress Note / Discharge  [facility] [address] [place] [address]      Patient: Sindi Dang   : 1965  Diagnosis/ICD-10 Code:  Old tear of lateral meniscus of left knee, unspecified tear type [M23.201]  Referring practitioner: Philip Mckeon MD  Date of Initial Visit: Type: THERAPY  Noted: 3/2/2023  Today's Date: 3/29/2023  Patient seen for 9 sessions      Subjective:   Subjective Questionnaire: LEFS: 55/80 = 68.75% Function  Clinical Progress: improved  Home Program Compliance: Yes  Treatment has included: therapeutic exercise, manual therapy and therapeutic activity    Subjective   The patient reported that her pain level is minimal today. Since starting PT, she has noticed a drastic improvement in left knee pain, swelling, and function. She can walk and get in/out of her car more easily. She feels like she can transfer to an independent HEP at this time. She is agreeable to discharge from PT.    Objective          Tenderness   Left Knee   Tenderness in the lateral joint line, lateral patella, lateral retinaculum and patellar tendon.     Neurological Testing     Additional Neurological Details  Sensation to light touch intact and equal bilaterally from L2-S1 dermatomal level.    Active Range of Motion   Left Knee   Flexion: 130 degrees   Extension: 0 degrees     Right Knee   Flexion: 140 degrees   Extension: 0 degrees     Patellar Mobility   Left Knee Patellar tendons within functional limits include the medial, lateral, superior and inferior.     Right Knee Patellar tendons within functional limits include the medial, lateral, superior and inferior.     Strength/Myotome Testing     Left Hip   Planes of Motion   Flexion: 4-  Extension: 4-  Abduction: 4-    Right Hip   Planes of Motion   Flexion: 4+  Extension: 4  Abduction: 4    Left Knee   Flexion: 4+  Extension: 4+  Quadriceps contraction: good    Right Knee   Flexion: 5  Extension: 5  Quadriceps contraction: good    Left  Ankle/Foot   Dorsiflexion: 5    Right Ankle/Foot   Dorsiflexion: 5    Tests     Left Knee   Negative anterior Lachman, posterior Lachman, valgus stress test at 0 degrees, valgus stress test at 30 degrees, varus stress test at 0 degrees and varus stress test at 30 degrees.     Ambulation     Ambulation: Level Surfaces     Additional Level Surfaces Ambulation Details  The patient ambulates with normal biomechanics of gait.      See Exercise, Manual, and Modality Logs for complete treatment.     Assessment & Plan     Assessment    Assessment details: The patient was re-evaluated today and presents with improvements in left knee pain, strength, ROM, gait, and functional mobility compared to her initial evaluation. She is capable of safely transitioning to an independent HEP at this time and is appropriate for discharge from PT.    Goals  Plan Goals: KNEE PROBLEMS:     1. The patient has limited ROM of the left knee.              LTG 1: 12 weeks:  The patient will demonstrate 0 to 135 degrees of ROM for the left knee in order to allow patient to ambulate and perform mobility related ADLs.                          STATUS:  Good progress              STG 1a: 6 weeks:  The patient will demonstrate 0 to 125 degrees of ROM for the left knee.                          STATUS: Met    2. The patient has limited strength of the left knee.              LTG 2: 12 weeks: The patient will demonstrate 4+ /5 strength for left knee flexion and extension in order to allow patient improved joint stability                          STATUS:  Met              STG 2a: 6 weeks: The patient will demonstrate 4 /5 strength for left knee flexion and extension                          STATUS:  Met                3. The patient has gait dysfunction.              LTG 3: 12 weeks:  The patient will ambulate without assistive device, independently, for community distances with normal biomechanics in order to improve mobility and allow patient to perform  activities such as grocery shopping with greater ease.                          STATUS:  Met    4. Mobility: Walking/Moving Around Functional Limitation                               LTG 4: 12 weeks:  The patient will demonstrate 25 % limitation by achieving a score of 60/80 on the LEFS.                          STATUS:  Good progress              STG 5 a: 6 weeks:  The patient will demonstrate 50 % limitation by achieving a score of 40/80 on the LEFS.                            STATUS:  Met    5. The patient has limited strength of the left hip.              LTG 5: 12 weeks: The patient will demonstrate 4+ /5 strength for left hip flexion, abduction, and extension in order to allow patient improved joint stability                          STATUS:  Good progress              STG 5a: 6 weeks: The patient will demonstrate 4 /5 strength for left hip flexion, abduction, and extension                          STATUS:  Met    Plan  Therapy options: will not be seen for skilled therapy services      Progress toward previous goals: Partially Met      Recommendations: Discharge    Prognosis to achieve goals: good    PT Signature: Carlos Caldwell PT    Electronically signed 3/29/2023    [place] License: PT - [id number]       Timed:  Manual Therapy:    0     mins  70720;  Therapeutic Exercise:    20     mins  24340;     Neuromuscular Jason:    0    mins  80319;    Therapeutic Activity:     10     mins  92011;     Gait Trainin     mins  87847;     Aquatics                         0      mins  64299    Un-timed:  Mechanical Traction      0     mins  91328  Dry Needling     0     mins self-pay  Electrical Stimulation:    0     mins  83810 ( );    Timed Treatment:   30   mins   Total Treatment:     30   mins

## 2023-04-13 ENCOUNTER — OFFICE VISIT (OUTPATIENT)
Dept: ORTHOPEDIC SURGERY | Facility: CLINIC | Age: 58
End: 2023-04-13
Payer: OTHER GOVERNMENT

## 2023-04-13 VITALS — OXYGEN SATURATION: 98 % | HEIGHT: 65 IN | BODY MASS INDEX: 30.82 KG/M2 | HEART RATE: 83 BPM | WEIGHT: 184.97 LBS

## 2023-04-13 DIAGNOSIS — M17.12 PRIMARY OSTEOARTHRITIS OF LEFT KNEE: ICD-10-CM

## 2023-04-13 DIAGNOSIS — S83.282A TEAR OF LATERAL MENISCUS OF LEFT KNEE, CURRENT, UNSPECIFIED TEAR TYPE, INITIAL ENCOUNTER: Primary | ICD-10-CM

## 2023-04-13 DIAGNOSIS — M25.462 EFFUSION OF LEFT KNEE: ICD-10-CM

## 2023-04-13 DIAGNOSIS — M71.22 BAKER'S CYST OF KNEE, LEFT: ICD-10-CM

## 2023-04-13 NOTE — PROGRESS NOTES
"Chief Complaint  Pain and Follow-up of the Left Knee    Subjective          History of Present Illness      Sindi Dang is a 57 y.o. female  presents to [facility] for     Patient presents for follow-up evaluation of left knee pain, left knee osteoarthritis, left knee Baker's cyst and left lateral meniscus tear.  She had MRI reviewed with her by Dr. Mckeon.  He discussed treatment options with her including physical therapy, she had steroid injection in the past.  She states today she has no pain in the left knee.  She states she could not touch the knee and move the knee very well in the past but states that she no longer has pain or swelling and has no difficulty with range of motion.  She continues home exercises since finishing therapy.  Dr. Mckeon had viscosupplementation approved for her she states she does not need the injection today.  No new complaints.      No Known Allergies     Social History     Socioeconomic History   • Marital status:    • Number of children: 1   Tobacco Use   • Smoking status: Never   • Smokeless tobacco: Never   Vaping Use   • Vaping Use: Never used   Substance and Sexual Activity   • Alcohol use: Never   • Sexual activity: Defer        REVIEW OF SYSTEMS    Constitutional: Denies fevers, chills, weight loss  Cardiovascular: Denies chest pain, shortness of breath  Skin: Denies rashes, acute skin changes  Neurologic: Denies headache, loss of consciousness  MSK: Left knee pain      Objective   Vital Signs:   Pulse 83   Ht 165.1 cm (65\")   Wt 83.9 kg (184 lb 15.5 oz)   SpO2 98%   BMI 30.78 kg/m²     Body mass index is 30.78 kg/m².    Physical Exam    Left knee: Skin is intact, no erythema, no ecchymosis, no swelling, no effusion, no signs of infection, full extension, flexion 120, stable anterior/posterior drawer, stable to varus/valgus stress.  Nontender to palpation, no pain with range of motion. Negative Sherwin, Negative " Lachman.    Procedures    Imaging Results (Most Recent)     None           Result Review :   The following data was reviewed by: TRE Jones on 04/13/2023:               Assessment and Plan    Diagnoses and all orders for this visit:    1. Tear of lateral meniscus of left knee, current, unspecified tear type, initial encounter (Primary)    2. Primary osteoarthritis of left knee    3. Baker's cyst of knee, left    4. Effusion of left knee        Discussed diagnosis and treatment options with the patient, discussed future treatment options including viscosupplementation, steroid injection, she would like to avoid treatment options at this time, follow-up as needed per patient request    Call or return if worsening symptoms.    Follow Up   Return if symptoms worsen or fail to improve.  Patient was given instructions and counseling regarding her condition or for health maintenance advice. Please see specific information pulled into the AVS if appropriate.

## 2023-12-29 ENCOUNTER — HOSPITAL ENCOUNTER (OUTPATIENT)
Dept: MAMMOGRAPHY | Facility: HOSPITAL | Age: 58
Discharge: HOME | End: 2023-12-29
Admitting: INTERNAL MEDICINE
Payer: OTHER GOVERNMENT

## 2023-12-29 DIAGNOSIS — Z12.31 ENCOUNTER FOR SCREENING MAMMOGRAM FOR BREAST CANCER: ICD-10-CM

## 2023-12-29 PROCEDURE — 77063 BREAST TOMOSYNTHESIS BI: CPT

## 2023-12-29 PROCEDURE — 77067 SCR MAMMO BI INCL CAD: CPT

## 2024-01-02 ENCOUNTER — TELEPHONE (OUTPATIENT)
Dept: ONCOLOGY | Facility: HOSPITAL | Age: 59
End: 2024-01-02
Payer: OTHER GOVERNMENT

## 2024-01-02 DIAGNOSIS — R92.8 ABNORMAL MAMMOGRAM OF RIGHT BREAST: Primary | ICD-10-CM

## 2024-01-02 NOTE — TELEPHONE ENCOUNTER
----- Message from Watson Haider MD sent at 1/2/2024 12:32 PM EST -----  Needs right diagnostic mammo and US    ----- Message -----  From: Interface, Rad Results Rochert In  Sent: 12/29/2023   3:17 PM EST  To: Watson Haider MD

## 2024-01-02 NOTE — TELEPHONE ENCOUNTER
Spoke with patient to inform her that additional imaging was needed of her right breast after the mammogram that she had on 12/29/23. Informed her that someone would be in touch with her to give her the appointments for the additional tests. Informed her that the f/u on 1/9 would be rescheduled if the new tests would be after the 9th. Patient v/u.

## 2024-01-04 DIAGNOSIS — Z17.0 MALIGNANT NEOPLASM OF LEFT BREAST IN FEMALE, ESTROGEN RECEPTOR POSITIVE, UNSPECIFIED SITE OF BREAST: ICD-10-CM

## 2024-01-04 DIAGNOSIS — C50.912 MALIGNANT NEOPLASM OF LEFT BREAST IN FEMALE, ESTROGEN RECEPTOR POSITIVE, UNSPECIFIED SITE OF BREAST: ICD-10-CM

## 2024-01-04 RX ORDER — ANASTROZOLE 1 MG/1
1 TABLET ORAL DAILY
Qty: 90 TABLET | Refills: 1 | Status: SHIPPED | OUTPATIENT
Start: 2024-01-04

## 2024-01-04 NOTE — TELEPHONE ENCOUNTER
Caller: Sindi Dang    Relationship: Self    Best call back number: [phone]    Requested Prescriptions:   Requested Prescriptions     Pending Prescriptions Disp Refills    anastrozole (ARIMIDEX) 1 MG tablet 90 tablet 1     Sig: Take 1 tablet by mouth Daily.        Pharmacy where request should be sent: [address] [phone] [facility] [phone]      Last office visit with prescribing clinician: 6/22/2023   Last telemedicine visit with prescribing clinician: Visit date not found   Next office visit with prescribing clinician: 1/10/2024     Additional details provided by patient: PATIENT WOULD LIKE ONE YEAR SUPPLY IF POSSIBLE    Does the patient have less than a 3 day supply:  [x] Yes  [] No    Would you like a call back once the refill request has been completed: [] Yes [x] No    If the office needs to give you a call back, can they leave a voicemail: [] Yes [x] No

## 2024-01-05 ENCOUNTER — TELEPHONE (OUTPATIENT)
Dept: ONCOLOGY | Facility: HOSPITAL | Age: 59
End: 2024-01-05

## 2024-01-05 NOTE — TELEPHONE ENCOUNTER
The [facility] received a fax that requires your attention. The document has been indexed to the patient’s chart for your review.      Reason for sending: APPROVED  REFERRAL W/ DREA IGLESIAS MD     Documents Description: REFERRAL APPROVED DETAILS     Name of Sender: [facility]ANT     Date Indexed: 1.5.24    Notes (if needed): INDEXED UNDER REFERRAL 1.5.24.  NEXT APPT IS 1.10.24 W/ DR. IGLESIAS   THANK YOU

## 2024-01-08 ENCOUNTER — HOSPITAL ENCOUNTER (OUTPATIENT)
Dept: MAMMOGRAPHY | Facility: HOSPITAL | Age: 59
Discharge: HOME | End: 2024-01-08
Payer: OTHER GOVERNMENT

## 2024-01-08 ENCOUNTER — HOSPITAL ENCOUNTER (OUTPATIENT)
Dept: ULTRASOUND IMAGING | Facility: HOSPITAL | Age: 59
Discharge: HOME | End: 2024-01-08
Payer: OTHER GOVERNMENT

## 2024-01-08 DIAGNOSIS — R92.8 ABNORMAL MAMMOGRAM OF RIGHT BREAST: ICD-10-CM

## 2024-01-08 PROCEDURE — 76642 ULTRASOUND BREAST LIMITED: CPT

## 2024-01-08 PROCEDURE — 77065 DX MAMMO INCL CAD UNI: CPT

## 2024-01-08 PROCEDURE — G0279 TOMOSYNTHESIS, MAMMO: HCPCS

## 2024-01-10 ENCOUNTER — OFFICE VISIT (OUTPATIENT)
Dept: ONCOLOGY | Facility: HOSPITAL | Age: 59
End: 2024-01-10
Payer: OTHER GOVERNMENT

## 2024-01-10 VITALS
WEIGHT: 183.86 LBS | DIASTOLIC BLOOD PRESSURE: 92 MMHG | SYSTOLIC BLOOD PRESSURE: 159 MMHG | HEIGHT: 65 IN | BODY MASS INDEX: 30.63 KG/M2 | OXYGEN SATURATION: 98 % | RESPIRATION RATE: 18 BRPM | TEMPERATURE: 97.5 F | HEART RATE: 76 BPM

## 2024-01-10 DIAGNOSIS — C50.912 MALIGNANT NEOPLASM OF LEFT BREAST IN FEMALE, ESTROGEN RECEPTOR POSITIVE, UNSPECIFIED SITE OF BREAST: Primary | ICD-10-CM

## 2024-01-10 DIAGNOSIS — Z17.0 MALIGNANT NEOPLASM OF LEFT BREAST IN FEMALE, ESTROGEN RECEPTOR POSITIVE, UNSPECIFIED SITE OF BREAST: Primary | ICD-10-CM

## 2024-01-10 PROBLEM — M17.12 PRIMARY OSTEOARTHRITIS OF LEFT KNEE: Status: RESOLVED | Noted: 2023-04-13 | Resolved: 2024-01-10

## 2024-01-10 PROBLEM — S83.282A TEAR OF LATERAL MENISCUS OF LEFT KNEE, CURRENT: Status: RESOLVED | Noted: 2023-04-13 | Resolved: 2024-01-10

## 2024-01-10 PROCEDURE — G0463 HOSPITAL OUTPT CLINIC VISIT: HCPCS | Performed by: INTERNAL MEDICINE

## 2024-01-10 NOTE — ASSESSMENT & PLAN NOTE
Patient is on extended adjuvant anastrozole.  I see no evidence of disease recurrence by history, physical examination or recent mammogram/ultrasound.  Continue anastrozole.  I will see her back in 6 months for ongoing treatment.

## 2024-01-10 NOTE — PROGRESS NOTES
Chief Complaint  Malignant neoplasm of left breast in female, estrogen recep    Maru Rosales, APRN  Ray, Maru, APRN    Subjective          Sindi Dang presents to [facility] for ongoing treatment of her breast cancer.  She is on extended adjuvant anastrozole.  Tolerating well.  Denies masses or adenopathy.  No unusual aches or pains.  Patient notes adequate appetite and her weight is maintained.  She is good energy level.    Oncology/Hematology History Overview Note   Left breast--Invasive ductal carcinoma with lobular features--ER+ TN-      HER2- by FISH Ki 67 20% Oncotype DX 25            Clinical Staging      Stage IA (T1N0M0)            Treatments      Chemotherapy      opted out of adj chemotherapy; began Anastrozole on 1/25/18      Radiation Therapy      3/19/18 completed XRT to left lumpectomy site      Surgeries      12/13/17 left lumpectomy      1/3/2023 Patient decided to continue Anastrazole for a total of 7 years.  Zolendronic Acid discontinued.     Cancer (Resolved)   6/24/2021 Initial Diagnosis    Cancer (CMS/HCC)     Malignant neoplasm of left breast in female, estrogen receptor positive   7/1/2021 Initial Diagnosis    Malignant neoplasm of left breast in female, estrogen receptor positive (CMS/HCC)     7/1/2021 Cancer Staged    Staging form: Breast, AJCC 8th Edition  - Clinical: cT1, cN0, cM0, ER+, TN+, HER2- - Signed by Watson Haider MD on 7/1/2021 9/8/2022 - 9/8/2022 Chemotherapy    OP SUPPORTIVE Zoledronic Acid Q6M         Review of Systems   Constitutional:  Negative for appetite change, diaphoresis, fatigue, fever, unexpected weight gain and unexpected weight loss.   HENT:  Negative for hearing loss, mouth sores, sore throat, swollen glands, trouble swallowing and voice change.    Eyes:  Negative for blurred vision.   Respiratory:  Negative for cough, shortness of breath and wheezing.    Cardiovascular:  Negative for chest pain and palpitations.    Gastrointestinal:  Negative for abdominal pain, blood in stool, constipation, diarrhea, nausea and vomiting.   Endocrine: Negative for cold intolerance and heat intolerance.   Genitourinary:  Negative for difficulty urinating, dysuria, frequency, hematuria and urinary incontinence.   Musculoskeletal:  Negative for arthralgias, back pain and myalgias.   Skin:  Negative for rash, skin lesions and wound.   Neurological:  Negative for dizziness, seizures, weakness, numbness and headache.   Hematological:  Does not bruise/bleed easily.   Psychiatric/Behavioral:  Negative for depressed mood. The patient is not nervous/anxious.      Current Outpatient Medications on File Prior to Visit   Medication Sig Dispense Refill    acetaminophen (TYLENOL) 325 MG tablet       anastrozole (ARIMIDEX) 1 MG tablet Take 1 tablet by mouth Daily. 90 tablet 1    Calcium 600-10 MG-MCG chewable tablet NOT CHEWABLE      clotrimazole (LOTRIMIN) 1 % cream       Multiple Vitamins-Minerals (Multivitamin Adult Extra C) chewable tablet       Omega-3 1000 MG capsule       pantoprazole (PROTONIX) 40 MG EC tablet       tretinoin (RETIN-A) 0.025 % cream       zoledronic acid (RECLAST) 5 MG/100ML solution infusion       ZOLMitriptan (ZOMIG) 2.5 MG tablet       diazePAM (Valium) 10 MG tablet 1 tablet by mouth one hour prior to MRI (Patient not taking: Reported on 1/10/2024) 1 tablet 0    Prenatal Vit-Fe Fumarate-FA (Prenatal 27-1) 27-1 MG tablet tablet  (Patient not taking: Reported on 1/10/2024)       No current facility-administered medications on file prior to visit.       No Known Allergies  Past Medical History:   Diagnosis Date    Breast cancer     Malignant neoplasm of left breast in female, estrogen receptor positive 07/01/2021    Thyroid disease      Past Surgical History:   Procedure Laterality Date    BREAST LUMPECTOMY      BUNIONECTOMY      OTHER SURGICAL HISTORY      BIOPSY    TUBAL ABDOMINAL LIGATION       Social History     Socioeconomic  "History    Marital status:     Number of children: 1   Tobacco Use    Smoking status: Never    Smokeless tobacco: Never   Vaping Use    Vaping Use: Never used   Substance and Sexual Activity    Alcohol use: Never    Sexual activity: Defer     Family History   Problem Relation Age of Onset    Anemia Other        Objective   Physical Exam  Vitals reviewed. Exam conducted with a chaperone present.   Constitutional:       General: She is not in acute distress.     Appearance: Normal appearance.   Cardiovascular:      Rate and Rhythm: Normal rate and regular rhythm.      Heart sounds: Normal heart sounds. No murmur heard.     No gallop.   Pulmonary:      Effort: Pulmonary effort is normal.      Breath sounds: Normal breath sounds.   Chest:   Breasts:     Right: Normal.      Left: Skin change (See diagram) present.       Abdominal:      General: Abdomen is flat. Bowel sounds are normal.      Palpations: Abdomen is soft.   Musculoskeletal:      Right lower leg: No edema.      Left lower leg: No edema.   Lymphadenopathy:      Upper Body:      Right upper body: No supraclavicular or axillary adenopathy.      Left upper body: No supraclavicular or axillary adenopathy.   Neurological:      Mental Status: She is alert and oriented to person, place, and time.   Psychiatric:         Mood and Affect: Mood normal.         Behavior: Behavior normal.         Vitals:    01/10/24 0820   BP: 159/92   Pulse: 76   Resp: 18   Temp: 97.5 °F (36.4 °C)   SpO2: 98%   Weight: 83.4 kg (183 lb 13.8 oz)   Height: 165.1 cm (65\")   PainSc: 0-No pain     ECOG score: 0         PHQ-9 Total Score:                    Result Review :   The following data was reviewed by: Watson Haider MD on 01/10/2024:  Lab Results   Component Value Date    HGB 14.7 08/23/2022    HCT 44.0 08/23/2022    MCV 86.6 08/23/2022     08/23/2022    WBC 5.14 08/23/2022    NEUTROABS 1.98 08/23/2022    LYMPHSABS 2.64 08/23/2022    MONOSABS 0.36 08/23/2022    EOSABS " "0.12 08/23/2022    BASOSABS 0.04 08/23/2022     Lab Results   Component Value Date    GLUCOSE 107 (H) 08/23/2022    BUN 13 08/23/2022    CREATININE 0.65 08/23/2022     08/23/2022    K 3.8 08/23/2022     08/23/2022    CO2 22.6 08/23/2022    CALCIUM 9.7 08/23/2022    PROTEINTOT 7.6 08/23/2022    ALBUMIN 4.49 08/23/2022    BILITOT 0.3 08/23/2022    ALKPHOS 88 08/23/2022    AST 21 08/23/2022    ALT 27 08/23/2022     Lab Results   Component Value Date    MG 2.2 08/23/2022    PHOS 4.0 08/23/2022     No results found for: \"IRON\", \"LABIRON\", \"TRANSFERRIN\", \"TIBC\"  No results found for: \"LDH\", \"FERRITIN\", \"[id number]\", \"FOLATE\"  No results found for: \"PSA\", \"CEA\", \"AFP\", \"\", \"\"    Data reviewed : Radiologic studies screening mammogram, diagnostic mammogram and ultrasound reviewed       Assessment and Plan    Diagnoses and all orders for this visit:    1. Malignant neoplasm of left breast in female, estrogen receptor positive, unspecified site of breast [C50.912, Z17.0] (Primary)  Assessment & Plan:  Patient is on extended adjuvant anastrozole.  I see no evidence of disease recurrence by history, physical examination or recent mammogram/ultrasound.  Continue anastrozole.  I will see her back in 6 months for ongoing treatment.              Patient Follow Up: 6 months    Patient was given instructions and counseling regarding her condition or for health maintenance advice. Please see specific information pulled into the AVS if appropriate.     Watson Haider MD    1/10/2024            "

## 2024-06-06 DIAGNOSIS — C50.912 MALIGNANT NEOPLASM OF LEFT BREAST IN FEMALE, ESTROGEN RECEPTOR POSITIVE, UNSPECIFIED SITE OF BREAST: ICD-10-CM

## 2024-06-06 DIAGNOSIS — Z17.0 MALIGNANT NEOPLASM OF LEFT BREAST IN FEMALE, ESTROGEN RECEPTOR POSITIVE, UNSPECIFIED SITE OF BREAST: ICD-10-CM

## 2024-06-06 RX ORDER — ANASTROZOLE 1 MG/1
1 TABLET ORAL DAILY
Qty: 90 TABLET | Refills: 1 | Status: SHIPPED | OUTPATIENT
Start: 2024-06-06

## 2024-06-06 NOTE — TELEPHONE ENCOUNTER
Caller: Sindi Dang    Relationship: Self    Best call back number: [phone]    Requested Prescriptions:   Requested Prescriptions     Pending Prescriptions Disp Refills    anastrozole (ARIMIDEX) 1 MG tablet 90 tablet 1     Sig: Take 1 tablet by mouth Daily.        Pharmacy where request should be sent: [address] [phone] [facility] [phone]      Last office visit with prescribing clinician: 1/10/2024   Last telemedicine visit with prescribing clinician: Visit date not found   Next office visit with prescribing clinician: 7/11/2024     Does the patient have less than a 3 day supply:  [] Yes  [x] No    Would you like a call back once the refill request has been completed: [] Yes [x] No    If the office needs to give you a call back, can they leave a voicemail: [] Yes [x] No

## 2024-07-11 ENCOUNTER — OFFICE VISIT (OUTPATIENT)
Dept: ONCOLOGY | Facility: HOSPITAL | Age: 59
End: 2024-07-11
Payer: OTHER GOVERNMENT

## 2024-07-11 VITALS
RESPIRATION RATE: 20 BRPM | HEART RATE: 70 BPM | OXYGEN SATURATION: 98 % | BODY MASS INDEX: 29.99 KG/M2 | DIASTOLIC BLOOD PRESSURE: 78 MMHG | SYSTOLIC BLOOD PRESSURE: 159 MMHG | WEIGHT: 180 LBS | TEMPERATURE: 98.3 F | HEIGHT: 65 IN

## 2024-07-11 DIAGNOSIS — Z12.31 ENCOUNTER FOR SCREENING MAMMOGRAM FOR BREAST CANCER: ICD-10-CM

## 2024-07-11 DIAGNOSIS — Z17.0 MALIGNANT NEOPLASM OF LEFT BREAST IN FEMALE, ESTROGEN RECEPTOR POSITIVE, UNSPECIFIED SITE OF BREAST: Primary | ICD-10-CM

## 2024-07-11 DIAGNOSIS — C50.912 MALIGNANT NEOPLASM OF LEFT BREAST IN FEMALE, ESTROGEN RECEPTOR POSITIVE, UNSPECIFIED SITE OF BREAST: Primary | ICD-10-CM

## 2024-07-11 PROCEDURE — G0463 HOSPITAL OUTPT CLINIC VISIT: HCPCS | Performed by: INTERNAL MEDICINE

## 2024-07-11 NOTE — PROGRESS NOTES
Chief Complaint  Malignant neoplasm of left breast in female, estrogen recep    Maru Rosales, Maru Gar APRN    Subjective          Sindi Dang presents to [facility] HEMATOLOGY & ONCOLOGY for ongoing treatment of her breast cancer.  She is on extended adjuvant anastrozole.  Compliant with the regimen.  Denies any issues or side effects.  No new masses or adenopathy.  She notes good appetite and energy level.    Oncology/Hematology History Overview Note   Left breast--Invasive ductal carcinoma with lobular features--ER+ [place]-      HER2- by FISH Ki 67 20% Oncotype DX 25            Clinical Staging      Stage IA (T1N0M0)            Treatments      Chemotherapy      opted out of adj chemotherapy; began Anastrozole on 1/25/18      Radiation Therapy      3/19/18 completed XRT to left lumpectomy site      Surgeries      12/13/17 left lumpectomy      1/3/2023 Patient decided to continue Anastrazole for a total of 7 years.  Zolendronic Acid discontinued.     Cancer (Resolved)   6/24/2021 Initial Diagnosis    Cancer (CMS/HCC)     Malignant neoplasm of left breast in female, estrogen receptor positive   7/1/2021 Initial Diagnosis    Malignant neoplasm of left breast in female, estrogen receptor positive (CMS/HCC)     7/1/2021 Cancer Staged    Staging form: Breast, AJCC 8th Edition  - Clinical: cT1, cN0, cM0, ER+, VA+, HER2- - Signed by Watson Haider MD on 7/1/2021 9/8/2022 - 9/8/2022 Chemotherapy    OP SUPPORTIVE Zoledronic Acid Q6M         Review of Systems   Constitutional:  Negative for fatigue.   Neurological:  Positive for headache.     Current Outpatient Medications on File Prior to Visit   Medication Sig Dispense Refill    acetaminophen (TYLENOL) 325 MG tablet       anastrozole (ARIMIDEX) 1 MG tablet Take 1 tablet by mouth Daily. 90 tablet 1    Calcium 600-10 MG-MCG chewable tablet NOT CHEWABLE      clotrimazole (LOTRIMIN) 1 % cream       diazePAM (Valium) 10 MG tablet 1 tablet by mouth  one hour prior to MRI 1 tablet 0    Multiple Vitamins-Minerals (Multivitamin Adult Extra C) chewable tablet       Omega-3 1000 MG capsule       pantoprazole (PROTONIX) 40 MG EC tablet       Prenatal Vit-Fe Fumarate-FA (Prenatal 27-1) 27-1 MG tablet tablet       tretinoin (RETIN-A) 0.025 % cream       zoledronic acid (RECLAST) 5 MG/100ML solution infusion       ZOLMitriptan (ZOMIG) 2.5 MG tablet        No current facility-administered medications on file prior to visit.       No Known Allergies  Past Medical History:   Diagnosis Date    Breast cancer     Malignant neoplasm of left breast in female, estrogen receptor positive 07/01/2021    Thyroid disease      Past Surgical History:   Procedure Laterality Date    BREAST LUMPECTOMY      BUNIONECTOMY      OTHER SURGICAL HISTORY      BIOPSY    TUBAL ABDOMINAL LIGATION       Social History     Socioeconomic History    Marital status:     Number of children: 1   Tobacco Use    Smoking status: Never    Smokeless tobacco: Never   Vaping Use    Vaping status: Never Used   Substance and Sexual Activity    Alcohol use: Never    Sexual activity: Defer     Family History   Problem Relation Age of Onset    Anemia Other        Objective   Physical Exam  Vitals reviewed. Exam conducted with a chaperone present.   Cardiovascular:      Rate and Rhythm: Normal rate and regular rhythm.      Heart sounds: Normal heart sounds. No murmur heard.     No gallop.   Pulmonary:      Effort: Pulmonary effort is normal.      Breath sounds: Normal breath sounds.   Chest:   Breasts:     Right: Normal.      Left: Skin change (See diagram) present.       Abdominal:      General: Bowel sounds are normal.   Musculoskeletal:      Right lower leg: No edema.      Left lower leg: No edema.   Lymphadenopathy:      Cervical: No cervical adenopathy.      Upper Body:      Right upper body: No supraclavicular or axillary adenopathy.      Left upper body: No supraclavicular or axillary adenopathy.  "  Psychiatric:         Mood and Affect: Mood normal.         Behavior: Behavior normal.         Vitals:    07/11/24 1044   BP: 159/78   Pulse: 70   Resp: 20   Temp: 98.3 °F (36.8 °C)   SpO2: 98%   Weight: 81.6 kg (180 lb)   Height: 165.1 cm (65\")   PainSc: 0-No pain     ECOG score: 0         PHQ-9 Total Score:                    Result Review :   The following data was reviewed by: Watson Haider MD on 07/11/2024:  Lab Results   Component Value Date    HGB 14.7 08/23/2022    HCT 44.0 08/23/2022    MCV 86.6 08/23/2022     08/23/2022    WBC 5.14 08/23/2022    NEUTROABS 1.98 08/23/2022    LYMPHSABS 2.64 08/23/2022    MONOSABS 0.36 08/23/2022    EOSABS 0.12 08/23/2022    BASOSABS 0.04 08/23/2022     Lab Results   Component Value Date    GLUCOSE 107 (H) 08/23/2022    BUN 13 08/23/2022    CREATININE 0.65 08/23/2022     08/23/2022    K 3.8 08/23/2022     08/23/2022    CO2 22.6 08/23/2022    CALCIUM 9.7 08/23/2022    PROTEINTOT 7.6 08/23/2022    ALBUMIN 4.49 08/23/2022    BILITOT 0.3 08/23/2022    ALKPHOS 88 08/23/2022    AST 21 08/23/2022    ALT 27 08/23/2022     Lab Results   Component Value Date    MG 2.2 08/23/2022    PHOS 4.0 08/23/2022     No results found for: \"IRON\", \"LABIRON\", \"TRANSFERRIN\", \"TIBC\"  No results found for: \"LDH\", \"FERRITIN\", \"RFROGFFZ06\", \"FOLATE\"  No results found for: \"PSA\", \"CEA\", \"AFP\", \"\", \"\"          Assessment and Plan    Diagnoses and all orders for this visit:    1. Malignant neoplasm of left breast in female, estrogen receptor positive, unspecified site of breast (Primary)  Assessment & Plan:  Patient is on extended adjuvant anastrozole.  Tolerating well.  No evidence of disease recurrence per history of physical examination.  She tries to exercise routinely.  She wishes to continue anastrozole which is very reasonable.  I will see her back in 6 months for ongoing treatment with mammogram prior.      2. Encounter for screening mammogram for breast cancer  -   "   Mammo Screening Digital Tomosynthesis Bilateral With CAD; Future            Patient Follow Up: 6 months with mammogram prior    Patient was given instructions and counseling regarding her condition or for health maintenance advice. Please see specific information pulled into the AVS if appropriate.     Watson Haider MD    7/12/2024

## 2024-07-12 NOTE — ASSESSMENT & PLAN NOTE
Patient is on extended adjuvant anastrozole.  Tolerating well.  No evidence of disease recurrence per history of physical examination.  She tries to exercise routinely.  She wishes to continue anastrozole which is very reasonable.  I will see her back in 6 months for ongoing treatment with mammogram prior.

## 2024-12-20 ENCOUNTER — TELEPHONE (OUTPATIENT)
Dept: ONCOLOGY | Facility: HOSPITAL | Age: 59
End: 2024-12-20
Payer: OTHER GOVERNMENT

## 2024-12-20 DIAGNOSIS — Z17.0 MALIGNANT NEOPLASM OF LEFT BREAST IN FEMALE, ESTROGEN RECEPTOR POSITIVE, UNSPECIFIED SITE OF BREAST: ICD-10-CM

## 2024-12-20 DIAGNOSIS — C50.912 MALIGNANT NEOPLASM OF LEFT BREAST IN FEMALE, ESTROGEN RECEPTOR POSITIVE, UNSPECIFIED SITE OF BREAST: ICD-10-CM

## 2024-12-20 RX ORDER — ANASTROZOLE 1 MG/1
1 TABLET ORAL DAILY
Qty: 90 TABLET | Refills: 1 | Status: SHIPPED | OUTPATIENT
Start: 2024-12-20

## 2024-12-20 NOTE — TELEPHONE ENCOUNTER
Caller: Sindi Dang    Relationship: Self    Best call back number:     [phone]     What is the best time to reach you: ANYTIME - OKAY TO LEAVE VM    Who are you requesting to speak with (clinical staff, provider,  specific staff member): NON-CLINICAL     What was the call regarding: PT WAS CHECKING TO SEE IF REFERRAL FROM [facility] HAS CAME IN TO APPROVE HER APPT WITH DR IGLESIAS ON 01/08?    Is it okay if the provider responds through MyChart: NO - PLEASE CALL TO ADVISE.

## 2024-12-20 NOTE — TELEPHONE ENCOUNTER
Caller: Sindi Dang    Relationship: Self    Best call back number:     [phone]       Requested Prescriptions:   Requested Prescriptions     Pending Prescriptions Disp Refills    anastrozole (ARIMIDEX) 1 MG tablet 90 tablet 1     Sig: Take 1 tablet by mouth Daily.        Pharmacy where request should be sent: [address] [phone] [facility] [phone]      Last office visit with prescribing clinician: 7/11/2024   Last telemedicine visit with prescribing clinician: Visit date not found   Next office visit with prescribing clinician: 1/8/2025     Additional details provided by patient:     Does the patient have less than a 3 day supply:  [] Yes  [x] No    Would you like a call back once the refill request has been completed: [] Yes [x] No    If the office needs to give you a call back, can they leave a voicemail: [] Yes [x] No

## 2024-12-30 ENCOUNTER — HOSPITAL ENCOUNTER (OUTPATIENT)
Dept: MAMMOGRAPHY | Facility: HOSPITAL | Age: 59
Discharge: HOME | End: 2024-12-30
Admitting: INTERNAL MEDICINE
Payer: OTHER GOVERNMENT

## 2024-12-30 DIAGNOSIS — Z12.31 ENCOUNTER FOR SCREENING MAMMOGRAM FOR BREAST CANCER: ICD-10-CM

## 2024-12-30 PROCEDURE — 77067 SCR MAMMO BI INCL CAD: CPT

## 2024-12-30 PROCEDURE — 77063 BREAST TOMOSYNTHESIS BI: CPT

## 2025-01-08 ENCOUNTER — OFFICE VISIT (OUTPATIENT)
Dept: ONCOLOGY | Facility: HOSPITAL | Age: 60
End: 2025-01-08
Payer: OTHER GOVERNMENT

## 2025-01-08 ENCOUNTER — TELEPHONE (OUTPATIENT)
Dept: ONCOLOGY | Facility: HOSPITAL | Age: 60
End: 2025-01-08

## 2025-01-08 VITALS
OXYGEN SATURATION: 99 % | BODY MASS INDEX: 30.82 KG/M2 | SYSTOLIC BLOOD PRESSURE: 163 MMHG | DIASTOLIC BLOOD PRESSURE: 84 MMHG | HEART RATE: 79 BPM | RESPIRATION RATE: 18 BRPM | TEMPERATURE: 98 F | WEIGHT: 185 LBS | HEIGHT: 65 IN

## 2025-01-08 DIAGNOSIS — C50.912 MALIGNANT NEOPLASM OF LEFT BREAST IN FEMALE, ESTROGEN RECEPTOR POSITIVE, UNSPECIFIED SITE OF BREAST: Primary | ICD-10-CM

## 2025-01-08 DIAGNOSIS — Z17.0 MALIGNANT NEOPLASM OF LEFT BREAST IN FEMALE, ESTROGEN RECEPTOR POSITIVE, UNSPECIFIED SITE OF BREAST: Primary | ICD-10-CM

## 2025-01-08 PROCEDURE — 99213 OFFICE O/P EST LOW 20 MIN: CPT | Performed by: INTERNAL MEDICINE

## 2025-01-08 PROCEDURE — G0463 HOSPITAL OUTPT CLINIC VISIT: HCPCS | Performed by: INTERNAL MEDICINE

## 2025-01-08 NOTE — PROGRESS NOTES
Chief Complaint  Malignant neoplasm of left breast in female, estrogen recep    Maru Rosales, Maru Gar APRN    Subjective          Sindi Dang presents to [facility] HEMATOLOGY & ONCOLOGY for ongoing treatment of her breast cancer.  She is on extended adjuvant therapy.  She is compliant with anastrozole.  She has new masses, adenopathy, unusual aches or pains.  She is exercising as tolerated.  Overall she feels well.    Oncology/Hematology History Overview Note   Left breast--Invasive ductal carcinoma with lobular features--ER+ [place]-      HER2- by FISH Ki 67 20% Oncotype DX 25            Clinical Staging      Stage IA (T1N0M0)            Treatments      Chemotherapy      opted out of adj chemotherapy; began Anastrozole on 1/25/18      Radiation Therapy      3/19/18 completed XRT to left lumpectomy site      Surgeries      12/13/17 left lumpectomy      1/3/2023 Patient decided to continue Anastrazole for a total of 7 years.  Zolendronic Acid discontinued.     Cancer (Resolved)   6/24/2021 Initial Diagnosis    Cancer (CMS/HCC)     Malignant neoplasm of left breast in female, estrogen receptor positive   7/1/2021 Initial Diagnosis    Malignant neoplasm of left breast in female, estrogen receptor positive (CMS/HCC)     7/1/2021 Cancer Staged    Staging form: Breast, AJCC 8th Edition  - Clinical: cT1, cN0, cM0, ER+, ND+, HER2- - Signed by Watson Haider MD on 7/1/2021 9/8/2022 - 9/8/2022 Chemotherapy    OP SUPPORTIVE Zoledronic Acid Q6M           Current Outpatient Medications on File Prior to Visit   Medication Sig Dispense Refill    acetaminophen (TYLENOL) 325 MG tablet       anastrozole (ARIMIDEX) 1 MG tablet Take 1 tablet by mouth Daily. 90 tablet 1    Calcium 600-10 MG-MCG chewable tablet NOT CHEWABLE      clotrimazole (LOTRIMIN) 1 % cream       diazePAM (Valium) 10 MG tablet 1 tablet by mouth one hour prior to MRI 1 tablet 0    Multiple Vitamins-Minerals (Multivitamin Adult Extra C)  chewable tablet       Omega-3 1000 MG capsule       pantoprazole (PROTONIX) 40 MG EC tablet       Prenatal Vit-Fe Fumarate-FA (Prenatal 27-1) 27-1 MG tablet tablet       tretinoin (RETIN-A) 0.025 % cream       zoledronic acid (RECLAST) 5 MG/100ML solution infusion       ZOLMitriptan (ZOMIG) 2.5 MG tablet        No current facility-administered medications on file prior to visit.       No Known Allergies  Past Medical History:   Diagnosis Date    Breast cancer     Malignant neoplasm of left breast in female, estrogen receptor positive 07/01/2021    Thyroid disease      Past Surgical History:   Procedure Laterality Date    BREAST LUMPECTOMY      BUNIONECTOMY      OTHER SURGICAL HISTORY      BIOPSY    TUBAL ABDOMINAL LIGATION       Social History     Socioeconomic History    Marital status:     Number of children: 1   Tobacco Use    Smoking status: Never    Smokeless tobacco: Never   Vaping Use    Vaping status: Never Used   Substance and Sexual Activity    Alcohol use: Never    Sexual activity: Defer     Family History   Problem Relation Age of Onset    Anemia Other        Objective   Physical Exam  Vitals reviewed. Exam conducted with a chaperone present.   Cardiovascular:      Rate and Rhythm: Normal rate and regular rhythm.      Heart sounds: Normal heart sounds. No murmur heard.     No gallop.   Pulmonary:      Effort: Pulmonary effort is normal.      Breath sounds: Normal breath sounds.   Chest:   Breasts:     Right: Normal.      Left: Skin change (See diagram) present.       Abdominal:      General: Bowel sounds are normal.   Lymphadenopathy:      Cervical: No cervical adenopathy.      Upper Body:      Right upper body: No supraclavicular or axillary adenopathy.      Left upper body: No supraclavicular or axillary adenopathy.   Psychiatric:         Mood and Affect: Mood normal.         Behavior: Behavior normal.         Vitals:    01/08/25 1130   BP: 163/84   Pulse: 79   Resp: 18   Temp: 98 °F (36.7 °C)  "  TempSrc: Temporal   SpO2: 99%   Weight: 83.9 kg (185 lb)   Height: 165.1 cm (65\")   PainSc: 0-No pain               PHQ-9 Total Score:                    Result Review :   The following data was reviewed by: aWtson Haider MD on 01/08/2025:  Lab Results   Component Value Date    HGB 14.7 08/23/2022    HCT 44.0 08/23/2022    MCV 86.6 08/23/2022     08/23/2022    WBC 5.14 08/23/2022    NEUTROABS 1.98 08/23/2022    LYMPHSABS 2.64 08/23/2022    MONOSABS 0.36 08/23/2022    EOSABS 0.12 08/23/2022    BASOSABS 0.04 08/23/2022     Lab Results   Component Value Date    GLUCOSE 107 (H) 08/23/2022    BUN 13 08/23/2022    CREATININE 0.65 08/23/2022     08/23/2022    K 3.8 08/23/2022     08/23/2022    CO2 22.6 08/23/2022    CALCIUM 9.7 08/23/2022    PROTEINTOT 7.6 08/23/2022    ALBUMIN 4.49 08/23/2022    BILITOT 0.3 08/23/2022    ALKPHOS 88 08/23/2022    AST 21 08/23/2022    ALT 27 08/23/2022     Lab Results   Component Value Date    MG 2.2 08/23/2022    PHOS 4.0 08/23/2022     No results found for: \"IRON\", \"LABIRON\", \"TRANSFERRIN\", \"TIBC\"  No results found for: \"LDH\", \"FERRITIN\", \"SSJONBMQ30\", \"FOLATE\"  No results found for: \"PSA\", \"CEA\", \"AFP\", \"\", \"\"    Data reviewed : Radiologic studies mammogram reviewed       Assessment and Plan    Diagnoses and all orders for this visit:    1. Malignant neoplasm of left breast in female, estrogen receptor positive, unspecified site of breast (Primary)  Assessment & Plan:  Patient is on extended adjuvant anastrozole.  Tolerating well.  No evidence of disease recurrence by her history or physical examination.  She is up-to-date on mammogram.  Continue with current therapy.  I will see her back in 6 months that purpose.    Orders:  -     DEXA Bone Density Axial; Future            Patient Follow Up: 6 months    Patient was given instructions and counseling regarding her condition or for health maintenance advice. Please see specific information pulled into the " AVS if appropriate.     Watson Haider MD    1/9/2025

## 2025-01-08 NOTE — TELEPHONE ENCOUNTER
[facility] received a fax that requires your attention. The document has been indexed to the patient’s chart for your review.      Reason for sending: RECEIVED UPDATED ONCOLOGY AUTH     Documents Description: UPDATED ONCOLOGY AUTH 01/08/25.> INDEXED IN CHART     Name of Sender: [facility] MAT KIRSTEN [phone]    Date Indexed: 01/08/25    Notes (if needed): THANKS!

## 2025-01-09 NOTE — ASSESSMENT & PLAN NOTE
Patient is on extended adjuvant anastrozole.  Tolerating well.  No evidence of disease recurrence by her history or physical examination.  She is up-to-date on mammogram.  Continue with current therapy.  I will see her back in 6 months that purpose.

## 2025-04-11 ENCOUNTER — TELEPHONE (OUTPATIENT)
Dept: GASTROENTEROLOGY | Facility: CLINIC | Age: 60
End: 2025-04-11
Payer: OTHER GOVERNMENT

## 2025-04-11 NOTE — TELEPHONE ENCOUNTER
Contacted patient about a referral from [facility] for a colonoscopy screening. Patient is scheduled for a 5 year recall with Dr Mendez and has an appointment with Vanessa Langley in Aug. Patient stated that she went to her primary care provider due to the wait on this appointment. I advised that right now we didn't have anything before the end of May. Then patient asked about the bowel prep I advised that Dr Whitman uses the prescription and not the miralx. Patient stated that she can not do the other prep. So as of right now she is going to contact her insurance for an updated referral to Dr Mendez. And if she decides to change provider she will call us at that time.

## 2025-05-28 DIAGNOSIS — Z17.0 MALIGNANT NEOPLASM OF LEFT BREAST IN FEMALE, ESTROGEN RECEPTOR POSITIVE, UNSPECIFIED SITE OF BREAST: ICD-10-CM

## 2025-05-28 DIAGNOSIS — C50.912 MALIGNANT NEOPLASM OF LEFT BREAST IN FEMALE, ESTROGEN RECEPTOR POSITIVE, UNSPECIFIED SITE OF BREAST: ICD-10-CM

## 2025-05-28 RX ORDER — ANASTROZOLE 1 MG/1
1 TABLET ORAL DAILY
Qty: 90 TABLET | Refills: 1 | Status: SHIPPED | OUTPATIENT
Start: 2025-05-28

## 2025-05-28 NOTE — TELEPHONE ENCOUNTER
Caller: Sindi Dang    Relationship: Self    Best call back number: [phone]      Requested Prescriptions:   Requested Prescriptions     Pending Prescriptions Disp Refills    anastrozole (ARIMIDEX) 1 MG tablet 90 tablet 1     Sig: Take 1 tablet by mouth Daily.        Pharmacy where request should be sent: [address] [phone] [facility] [phone]      Last office visit with prescribing clinician: 1/8/2025   Last telemedicine visit with prescribing clinician: Visit date not found   Next office visit with prescribing clinician: 7/9/2025     Would like call when it is sent over due to being at [facility] rx.    Does the patient have less than a 3 day supply:  [x] Yes  [] No    Would you like a call back once the refill request has been completed: [] Yes [x] No    If the office needs to give you a call back, can they leave a voicemail: [] Yes [x] No

## 2025-07-01 ENCOUNTER — HOSPITAL ENCOUNTER (OUTPATIENT)
Dept: BONE DENSITY | Facility: HOSPITAL | Age: 60
Discharge: HOME | End: 2025-07-01
Admitting: INTERNAL MEDICINE
Payer: OTHER GOVERNMENT

## 2025-07-01 DIAGNOSIS — C50.912 MALIGNANT NEOPLASM OF LEFT BREAST IN FEMALE, ESTROGEN RECEPTOR POSITIVE, UNSPECIFIED SITE OF BREAST: ICD-10-CM

## 2025-07-01 DIAGNOSIS — Z17.0 MALIGNANT NEOPLASM OF LEFT BREAST IN FEMALE, ESTROGEN RECEPTOR POSITIVE, UNSPECIFIED SITE OF BREAST: ICD-10-CM

## 2025-07-01 PROCEDURE — 77080 DXA BONE DENSITY AXIAL: CPT

## 2025-07-09 ENCOUNTER — OFFICE VISIT (OUTPATIENT)
Dept: ONCOLOGY | Facility: HOSPITAL | Age: 60
End: 2025-07-09
Payer: OTHER GOVERNMENT

## 2025-07-09 VITALS
HEART RATE: 73 BPM | SYSTOLIC BLOOD PRESSURE: 159 MMHG | TEMPERATURE: 98.8 F | DIASTOLIC BLOOD PRESSURE: 72 MMHG | WEIGHT: 187.61 LBS | OXYGEN SATURATION: 98 % | HEIGHT: 65 IN | BODY MASS INDEX: 31.26 KG/M2 | RESPIRATION RATE: 18 BRPM

## 2025-07-09 DIAGNOSIS — Z17.0 MALIGNANT NEOPLASM OF LEFT BREAST IN FEMALE, ESTROGEN RECEPTOR POSITIVE, UNSPECIFIED SITE OF BREAST: Primary | ICD-10-CM

## 2025-07-09 DIAGNOSIS — Z12.31 ENCOUNTER FOR SCREENING MAMMOGRAM FOR BREAST CANCER: ICD-10-CM

## 2025-07-09 DIAGNOSIS — C50.912 MALIGNANT NEOPLASM OF LEFT BREAST IN FEMALE, ESTROGEN RECEPTOR POSITIVE, UNSPECIFIED SITE OF BREAST: Primary | ICD-10-CM

## 2025-07-09 PROCEDURE — G0463 HOSPITAL OUTPT CLINIC VISIT: HCPCS | Performed by: INTERNAL MEDICINE

## 2025-07-09 NOTE — PROGRESS NOTES
Chief Complaint  Malignant neoplasm of left breast in female, estrogen recep    Maru Rosales, Maru Gar APRN    Subjective          Sindi Dang presents to [facility] HEMATOLOGY & ONCOLOGY for ongoing treatment of her breast cancer.  She is status posttreatments as outlined below.  Currently on anastrozole.  Compliant with the regimen.  Tolerating well.  She denies vasomotor symptoms or arthralgias.  No new masses or adenopathy.  Overall she is feeling well.    Oncology/Hematology History Overview Note   Left breast--Invasive ductal carcinoma with lobular features--ER+ [place]-      HER2- by FISH Ki 67 20% Oncotype DX 25            Clinical Staging      Stage IA (T1N0M0)            Treatments      Chemotherapy      opted out of adj chemotherapy; began Anastrozole on 1/25/18      Radiation Therapy      3/19/18 completed XRT to left lumpectomy site      Surgeries      12/13/17 left lumpectomy      1/3/2023 Patient decided to continue Anastrazole for a total of 7 years.  Zolendronic Acid discontinued.     Cancer (Resolved)   6/24/2021 Initial Diagnosis    Cancer (CMS/HCC)     Malignant neoplasm of left breast in female, estrogen receptor positive   7/1/2021 Initial Diagnosis    Malignant neoplasm of left breast in female, estrogen receptor positive (CMS/HCC)     7/1/2021 Cancer Staged    Staging form: Breast, AJCC 8th Edition  - Clinical: cT1, cN0, cM0, ER+, [place]+, HER2- - Signed by Watson Haider MD on 7/1/2021 9/8/2022 - 9/8/2022 Chemotherapy    OP SUPPORTIVE Zoledronic Acid Q6M           Current Outpatient Medications on File Prior to Visit   Medication Sig Dispense Refill    acetaminophen (TYLENOL) 325 MG tablet       anastrozole (ARIMIDEX) 1 MG tablet Take 1 tablet by mouth Daily. 90 tablet 1    Calcium 600-10 MG-MCG chewable tablet NOT CHEWABLE      clotrimazole (LOTRIMIN) 1 % cream  (Patient not taking: Reported on 7/9/2025)      diazePAM (Valium) 10 MG tablet 1 tablet by mouth one hour  prior to MRI (Patient not taking: Reported on 7/9/2025) 1 tablet 0    Multiple Vitamins-Minerals (Multivitamin Adult Extra C) chewable tablet       Omega-3 1000 MG capsule       pantoprazole (PROTONIX) 40 MG EC tablet       Prenatal Vit-Fe Fumarate-FA (Prenatal 27-1) 27-1 MG tablet tablet  (Patient not taking: Reported on 7/9/2025)      tretinoin (RETIN-A) 0.025 % cream       zoledronic acid (RECLAST) 5 MG/100ML solution infusion  (Patient not taking: Reported on 7/9/2025)      ZOLMitriptan (ZOMIG) 2.5 MG tablet        No current facility-administered medications on file prior to visit.       No Known Allergies  Past Medical History:   Diagnosis Date    Breast cancer     Malignant neoplasm of left breast in female, estrogen receptor positive 07/01/2021    Thyroid disease      Past Surgical History:   Procedure Laterality Date    BREAST LUMPECTOMY      BUNIONECTOMY      OTHER SURGICAL HISTORY      BIOPSY    TUBAL ABDOMINAL LIGATION       Social History     Socioeconomic History    Marital status:     Number of children: 1   Tobacco Use    Smoking status: Never    Smokeless tobacco: Never   Vaping Use    Vaping status: Never Used   Substance and Sexual Activity    Alcohol use: Never    Sexual activity: Defer     Family History   Problem Relation Age of Onset    Anemia Other        Objective   Physical Exam  Vitals reviewed. Exam conducted with a chaperone present.   Cardiovascular:      Rate and Rhythm: Normal rate and regular rhythm.      Heart sounds: Normal heart sounds. No murmur heard.     No gallop.   Pulmonary:      Effort: Pulmonary effort is normal.      Breath sounds: Normal breath sounds.   Chest:   Breasts:     Right: Normal.       Abdominal:      General: Bowel sounds are normal.   Lymphadenopathy:      Cervical: No cervical adenopathy.      Upper Body:      Right upper body: No supraclavicular or axillary adenopathy.      Left upper body: No supraclavicular or axillary adenopathy.   Psychiatric:    "      Mood and Affect: Mood normal.         Behavior: Behavior normal.         Vitals:    07/09/25 1017   BP: 159/72   Pulse: 73   Resp: 18   Temp: 98.8 °F (37.1 °C)   TempSrc: Temporal   SpO2: 98%   Weight: 85.1 kg (187 lb 9.8 oz)   Height: 165.1 cm (65\")   PainSc: 0-No pain     ECOG score: 0         PHQ-9 Total Score:                    Result Review :   The following data was reviewed by: Watson Haider MD on 07/09/2025:  Lab Results   Component Value Date    HGB 14.7 08/23/2022    HCT 44.0 08/23/2022    MCV 86.6 08/23/2022     08/23/2022    WBC 5.14 08/23/2022    NEUTROABS 1.98 08/23/2022    LYMPHSABS 2.64 08/23/2022    MONOSABS 0.36 08/23/2022    EOSABS 0.12 08/23/2022    BASOSABS 0.04 08/23/2022     Lab Results   Component Value Date    GLUCOSE 107 (H) 08/23/2022    BUN 13 08/23/2022    CREATININE 0.65 08/23/2022     08/23/2022    K 3.8 08/23/2022     08/23/2022    CO2 22.6 08/23/2022    CALCIUM 9.7 08/23/2022    PROTEINTOT 7.6 08/23/2022    ALBUMIN 4.49 08/23/2022    BILITOT 0.3 08/23/2022    ALKPHOS 88 08/23/2022    AST 21 08/23/2022    ALT 27 08/23/2022     Lab Results   Component Value Date    MG 2.2 08/23/2022    PHOS 4.0 08/23/2022     No results found for: \"IRON\", \"LABIRON\", \"TRANSFERRIN\", \"TIBC\"  No results found for: \"LDH\", \"FERRITIN\", \"OQQXYEJO01\", \"FOLATE\"  No results found for: \"PSA\", \"CEA\", \"AFP\", \"\", \"\"          Assessment and Plan    Diagnoses and all orders for this visit:    1. Malignant neoplasm of left breast in female, estrogen receptor positive, unspecified site of breast (Primary)  Assessment & Plan:  Patient is on extended adjuvant anastrozole.  Tolerating well.  I see no evidence of disease recurrence but her history, physical examination.  Given good tolerance and response to therapy, I will continue for total of 10 years in the extended adjuvant setting.  I will see her back in 6 months for continued treatment with mammogram prior.      2. Encounter for " screening mammogram for breast cancer  -     Mammo Screening Digital Tomosynthesis Bilateral With CAD; Future            Patient Follow Up: 6 months    Patient was given instructions and counseling regarding her condition or for health maintenance advice. Please see specific information pulled into the AVS if appropriate.     Watson Haider MD    7/10/2025

## 2025-07-10 NOTE — ASSESSMENT & PLAN NOTE
Patient is on extended adjuvant anastrozole.  Tolerating well.  I see no evidence of disease recurrence but her history, physical examination.  Given good tolerance and response to therapy, I will continue for total of 10 years in the extended adjuvant setting.  I will see her back in 6 months for continued treatment with mammogram prior.

## 2025-08-27 ENCOUNTER — OFFICE VISIT (OUTPATIENT)
Dept: SURGERY | Facility: CLINIC | Age: 60
End: 2025-08-27
Payer: OTHER GOVERNMENT

## 2025-08-27 ENCOUNTER — PREP FOR SURGERY (OUTPATIENT)
Dept: OTHER | Facility: HOSPITAL | Age: 60
End: 2025-08-27
Payer: OTHER GOVERNMENT

## 2025-08-27 VITALS
OXYGEN SATURATION: 100 % | HEART RATE: 69 BPM | WEIGHT: 187.83 LBS | HEIGHT: 65 IN | BODY MASS INDEX: 31.29 KG/M2 | SYSTOLIC BLOOD PRESSURE: 180 MMHG | DIASTOLIC BLOOD PRESSURE: 89 MMHG

## 2025-08-27 DIAGNOSIS — Z12.11 SCREENING FOR MALIGNANT NEOPLASM OF COLON: Primary | ICD-10-CM

## 2025-08-27 DIAGNOSIS — Z86.0100 HISTORY OF COLONIC POLYPS: ICD-10-CM

## 2025-08-27 DIAGNOSIS — Z12.11 ENCOUNTER FOR SCREENING FOR MALIGNANT NEOPLASM OF COLON: Primary | ICD-10-CM

## 2025-08-27 DIAGNOSIS — Z85.3 HISTORY OF BREAST CANCER: ICD-10-CM

## 2025-08-27 RX ORDER — CALCIUM CARBONATE/VITAMIN D3 600 MG-10
TABLET ORAL
COMMUNITY
Start: 2025-08-05

## 2025-08-27 RX ORDER — BISACODYL 5 MG
TABLET, DELAYED RELEASE (ENTERIC COATED) ORAL
Qty: 4 TABLET | Refills: 0 | Status: SHIPPED | OUTPATIENT
Start: 2025-08-27

## 2025-08-27 RX ORDER — POLYETHYLENE GLYCOL 3350 17 G/17G
POWDER, FOR SOLUTION ORAL
Qty: 238 PACKET | Refills: 0 | Status: SHIPPED | OUTPATIENT
Start: 2025-08-27

## 2025-08-27 RX ORDER — SODIUM CHLORIDE 0.9 % (FLUSH) 0.9 %
10 SYRINGE (ML) INJECTION AS NEEDED
OUTPATIENT
Start: 2025-08-27

## 2025-08-27 RX ORDER — SODIUM CHLORIDE 9 MG/ML
40 INJECTION, SOLUTION INTRAVENOUS AS NEEDED
OUTPATIENT
Start: 2025-08-27

## 2025-08-27 RX ORDER — SODIUM CHLORIDE 0.9 % (FLUSH) 0.9 %
3 SYRINGE (ML) INJECTION EVERY 12 HOURS SCHEDULED
OUTPATIENT
Start: 2025-08-27